# Patient Record
Sex: MALE | Race: WHITE | Employment: OTHER | URBAN - METROPOLITAN AREA
[De-identification: names, ages, dates, MRNs, and addresses within clinical notes are randomized per-mention and may not be internally consistent; named-entity substitution may affect disease eponyms.]

---

## 2021-01-27 ENCOUNTER — OFFICE VISIT (OUTPATIENT)
Dept: FAMILY MEDICINE CLINIC | Facility: CLINIC | Age: 66
End: 2021-01-27
Payer: MEDICARE

## 2021-01-27 ENCOUNTER — LAB ENCOUNTER (OUTPATIENT)
Dept: LAB | Age: 66
End: 2021-01-27
Attending: FAMILY MEDICINE
Payer: MEDICARE

## 2021-01-27 VITALS
HEART RATE: 71 BPM | BODY MASS INDEX: 29.83 KG/M2 | SYSTOLIC BLOOD PRESSURE: 128 MMHG | WEIGHT: 206 LBS | DIASTOLIC BLOOD PRESSURE: 82 MMHG | HEIGHT: 69.6 IN

## 2021-01-27 DIAGNOSIS — Z12.11 ENCOUNTER FOR SCREENING COLONOSCOPY: ICD-10-CM

## 2021-01-27 DIAGNOSIS — Z00.00 ANNUAL PHYSICAL EXAM: Primary | ICD-10-CM

## 2021-01-27 DIAGNOSIS — Z00.00 ANNUAL PHYSICAL EXAM: ICD-10-CM

## 2021-01-27 LAB
ALBUMIN SERPL-MCNC: 4 G/DL (ref 3.4–5)
ALBUMIN/GLOB SERPL: 1 {RATIO} (ref 1–2)
ALP LIVER SERPL-CCNC: 108 U/L
ALT SERPL-CCNC: 97 U/L
ANION GAP SERPL CALC-SCNC: 6 MMOL/L (ref 0–18)
AST SERPL-CCNC: 51 U/L (ref 15–37)
BASOPHILS # BLD AUTO: 0.04 X10(3) UL (ref 0–0.2)
BASOPHILS NFR BLD AUTO: 0.8 %
BILIRUB SERPL-MCNC: 0.5 MG/DL (ref 0.1–2)
BUN BLD-MCNC: 14 MG/DL (ref 7–18)
BUN/CREAT SERPL: 16.9 (ref 10–20)
CALCIUM BLD-MCNC: 9.4 MG/DL (ref 8.5–10.1)
CHLORIDE SERPL-SCNC: 107 MMOL/L (ref 98–112)
CHOLEST SMN-MCNC: 186 MG/DL (ref ?–200)
CO2 SERPL-SCNC: 25 MMOL/L (ref 21–32)
CREAT BLD-MCNC: 0.83 MG/DL
DEPRECATED RDW RBC AUTO: 40.5 FL (ref 35.1–46.3)
EOSINOPHIL # BLD AUTO: 0.07 X10(3) UL (ref 0–0.7)
EOSINOPHIL NFR BLD AUTO: 1.3 %
ERYTHROCYTE [DISTWIDTH] IN BLOOD BY AUTOMATED COUNT: 12.4 % (ref 11–15)
GLOBULIN PLAS-MCNC: 4.1 G/DL (ref 2.8–4.4)
GLUCOSE BLD-MCNC: 105 MG/DL (ref 70–99)
HCT VFR BLD AUTO: 45.4 %
HDLC SERPL-MCNC: 35 MG/DL (ref 40–59)
HGB BLD-MCNC: 15.4 G/DL
IMM GRANULOCYTES # BLD AUTO: 0.02 X10(3) UL (ref 0–1)
IMM GRANULOCYTES NFR BLD: 0.4 %
LDLC SERPL CALC-MCNC: 98 MG/DL (ref ?–100)
LYMPHOCYTES # BLD AUTO: 1.8 X10(3) UL (ref 1–4)
LYMPHOCYTES NFR BLD AUTO: 34.5 %
M PROTEIN MFR SERPL ELPH: 8.1 G/DL (ref 6.4–8.2)
MCH RBC QN AUTO: 30 PG (ref 26–34)
MCHC RBC AUTO-ENTMCNC: 33.9 G/DL (ref 31–37)
MCV RBC AUTO: 88.5 FL
MONOCYTES # BLD AUTO: 0.67 X10(3) UL (ref 0.1–1)
MONOCYTES NFR BLD AUTO: 12.8 %
NEUTROPHILS # BLD AUTO: 2.62 X10 (3) UL (ref 1.5–7.7)
NEUTROPHILS # BLD AUTO: 2.62 X10(3) UL (ref 1.5–7.7)
NEUTROPHILS NFR BLD AUTO: 50.2 %
NONHDLC SERPL-MCNC: 151 MG/DL (ref ?–130)
OSMOLALITY SERPL CALC.SUM OF ELEC: 287 MOSM/KG (ref 275–295)
PATIENT FASTING Y/N/NP: YES
PATIENT FASTING Y/N/NP: YES
PLATELET # BLD AUTO: 254 10(3)UL (ref 150–450)
POTASSIUM SERPL-SCNC: 4.3 MMOL/L (ref 3.5–5.1)
RBC # BLD AUTO: 5.13 X10(6)UL
SODIUM SERPL-SCNC: 138 MMOL/L (ref 136–145)
TRIGL SERPL-MCNC: 264 MG/DL (ref 30–149)
TSI SER-ACNC: 1.44 MIU/ML (ref 0.36–3.74)
VLDLC SERPL CALC-MCNC: 53 MG/DL (ref 0–30)
WBC # BLD AUTO: 5.2 X10(3) UL (ref 4–11)

## 2021-01-27 PROCEDURE — 84443 ASSAY THYROID STIM HORMONE: CPT

## 2021-01-27 PROCEDURE — 3079F DIAST BP 80-89 MM HG: CPT | Performed by: FAMILY MEDICINE

## 2021-01-27 PROCEDURE — 80061 LIPID PANEL: CPT

## 2021-01-27 PROCEDURE — 36415 COLL VENOUS BLD VENIPUNCTURE: CPT

## 2021-01-27 PROCEDURE — 85025 COMPLETE CBC W/AUTO DIFF WBC: CPT

## 2021-01-27 PROCEDURE — 80053 COMPREHEN METABOLIC PANEL: CPT

## 2021-01-27 PROCEDURE — 99213 OFFICE O/P EST LOW 20 MIN: CPT | Performed by: FAMILY MEDICINE

## 2021-01-27 PROCEDURE — 3074F SYST BP LT 130 MM HG: CPT | Performed by: FAMILY MEDICINE

## 2021-01-27 PROCEDURE — 3008F BODY MASS INDEX DOCD: CPT | Performed by: FAMILY MEDICINE

## 2021-01-27 RX ORDER — OMEPRAZOLE 20 MG/1
CAPSULE, DELAYED RELEASE ORAL
COMMUNITY
Start: 2021-01-04

## 2021-01-27 RX ORDER — LISINOPRIL 10 MG/1
TABLET ORAL
COMMUNITY
Start: 2021-01-04 | End: 2021-04-23

## 2021-01-27 NOTE — PROGRESS NOTES
HPI:    Patient ID: Rajani Guerrero is a 76year old male. Here for annual physical  Just retired. Patient for f/u hypertension   Denies any chest pain shortness of breath or headaches. Monitoring blood pressure at home and is below 135/85.  Needs refi Visit:  Requested Prescriptions      No prescriptions requested or ordered in this encounter       Imaging & Referrals:  OP REFERRAL TO [address]       OS#[id number]

## 2021-02-12 ENCOUNTER — OFFICE VISIT (OUTPATIENT)
Dept: FAMILY MEDICINE CLINIC | Facility: CLINIC | Age: 66
End: 2021-02-12
Payer: MEDICARE

## 2021-02-12 ENCOUNTER — LAB ENCOUNTER (OUTPATIENT)
Dept: LAB | Age: 66
End: 2021-02-12
Attending: FAMILY MEDICINE
Payer: MEDICARE

## 2021-02-12 VITALS
HEART RATE: 88 BPM | DIASTOLIC BLOOD PRESSURE: 82 MMHG | SYSTOLIC BLOOD PRESSURE: 140 MMHG | BODY MASS INDEX: 30.55 KG/M2 | WEIGHT: 211 LBS | HEIGHT: 69.6 IN

## 2021-02-12 DIAGNOSIS — R74.8 ABNORMAL LIVER ENZYMES: ICD-10-CM

## 2021-02-12 DIAGNOSIS — R74.8 ABNORMAL LIVER ENZYMES: Primary | ICD-10-CM

## 2021-02-12 LAB
CERULOPLASMIN SERPL-MCNC: 18.4 MG/DL (ref 20–60)
HAV AB SER QL IA: REACTIVE
HAV IGM SER QL: NONREACTIVE
HBV CORE AB SERPL QL IA: NONREACTIVE
HBV SURFACE AB SER QL: NONREACTIVE
HBV SURFACE AB SERPL IA-ACNC: <3.1 MIU/ML
HBV SURFACE AG SERPL QL IA: NONREACTIVE
HCV AB SERPL QL IA: NONREACTIVE
IRON SATURATION: 29 %
IRON SERPL-MCNC: 130 UG/DL
TOTAL IRON BINDING CAPACITY: 456 UG/DL (ref 240–450)
TRANSFERRIN SERPL-MCNC: 306 MG/DL (ref 200–360)

## 2021-02-12 PROCEDURE — 3077F SYST BP >= 140 MM HG: CPT | Performed by: FAMILY MEDICINE

## 2021-02-12 PROCEDURE — 82390 ASSAY OF CERULOPLASMIN: CPT

## 2021-02-12 PROCEDURE — 86803 HEPATITIS C AB TEST: CPT

## 2021-02-12 PROCEDURE — 86706 HEP B SURFACE ANTIBODY: CPT

## 2021-02-12 PROCEDURE — 84466 ASSAY OF TRANSFERRIN: CPT

## 2021-02-12 PROCEDURE — 86704 HEP B CORE ANTIBODY TOTAL: CPT

## 2021-02-12 PROCEDURE — 83540 ASSAY OF IRON: CPT

## 2021-02-12 PROCEDURE — 36415 COLL VENOUS BLD VENIPUNCTURE: CPT

## 2021-02-12 PROCEDURE — 80500 HEPATITIS A B + C PROFILE: CPT

## 2021-02-12 PROCEDURE — 99213 OFFICE O/P EST LOW 20 MIN: CPT | Performed by: FAMILY MEDICINE

## 2021-02-12 PROCEDURE — 3008F BODY MASS INDEX DOCD: CPT | Performed by: FAMILY MEDICINE

## 2021-02-12 PROCEDURE — 87340 HEPATITIS B SURFACE AG IA: CPT

## 2021-02-12 PROCEDURE — 86708 HEPATITIS A ANTIBODY: CPT

## 2021-02-12 PROCEDURE — 86709 HEPATITIS A IGM ANTIBODY: CPT

## 2021-02-12 PROCEDURE — 3079F DIAST BP 80-89 MM HG: CPT | Performed by: FAMILY MEDICINE

## 2021-02-12 NOTE — PROGRESS NOTES
HPI:    Patient ID: Garrison Hernandez is a 76year old male. Here for abnormal LFTs and trigs. Drinks more then recommended  Used to drink more now 3 beers few times a week.    No abdominal pain  No depression no anxiety       Review of Systems  Constituti

## 2021-02-18 NOTE — H&P
[address] [facility] [address] Gastroenterology                                                                                                  Clinic History and Physical     Pa Problems Son    • No Known Problems Maternal Grandmother    • No Known Problems Maternal Grandfather    • No Known Problems Paternal Grandmother    • No Known Problems Paternal Grandfather    • No Known Problems Sister    • No Known Problems Brother evident.    Neuro: Alert and oriented x4, and patient is having movements of all 4 extremities   Psych: Pt has a normal mood and affect, behavior is normal    Nursing note and vitals reviewed      Labs/Imaging:     Patient's labs and imaging were reviewed a diagnosis), benefits, and alternatives to colonoscopy with the patient [who demonstrated understanding], including but not limited to the risks of bleeding, infection, pain, as well as the risks of anesthesia and perforation all leading to prolonged hospit

## 2021-03-04 ENCOUNTER — TELEPHONE (OUTPATIENT)
Dept: GASTROENTEROLOGY | Facility: CLINIC | Age: 66
End: 2021-03-04

## 2021-03-04 ENCOUNTER — OFFICE VISIT (OUTPATIENT)
Dept: GASTROENTEROLOGY | Facility: CLINIC | Age: 66
End: 2021-03-04
Payer: MEDICARE

## 2021-03-04 VITALS
DIASTOLIC BLOOD PRESSURE: 87 MMHG | BODY MASS INDEX: 30.96 KG/M2 | SYSTOLIC BLOOD PRESSURE: 142 MMHG | HEART RATE: 83 BPM | WEIGHT: 209 LBS | HEIGHT: 69 IN

## 2021-03-04 DIAGNOSIS — Z12.11 SCREENING FOR COLON CANCER: Primary | ICD-10-CM

## 2021-03-04 DIAGNOSIS — Z79.899 MEDICATION MANAGEMENT: ICD-10-CM

## 2021-03-04 PROCEDURE — 3008F BODY MASS INDEX DOCD: CPT | Performed by: NURSE PRACTITIONER

## 2021-03-04 PROCEDURE — 99203 OFFICE O/P NEW LOW 30 MIN: CPT | Performed by: NURSE PRACTITIONER

## 2021-03-04 PROCEDURE — 3077F SYST BP >= 140 MM HG: CPT | Performed by: NURSE PRACTITIONER

## 2021-03-04 PROCEDURE — 3079F DIAST BP 80-89 MM HG: CPT | Performed by: NURSE PRACTITIONER

## 2021-03-04 RX ORDER — CHLORHEXIDINE GLUCONATE 0.12 MG/ML
RINSE ORAL
COMMUNITY
Start: 2021-03-01

## 2021-03-04 NOTE — TELEPHONE ENCOUNTER
Scheduled for:  Colonoscopy 85045  Provider Name:  Dr Ismael Mcgraw  Date: 6/15/2021    Location:  [facility]  Sedation:  MAC  Time:  9:45AM (pt is aware to arrive at 845)  Prep:  Suprep  Meds/Allergies Reconciled?:  Martha/APN reviewed.   Diagnosis with codes:  Co

## 2021-03-04 NOTE — PATIENT INSTRUCTIONS
-Schedule colonoscopy w/Dr. Eli Laboy w/ MAC or Dr. Terrell Leo w/ IV Twilight or MAC  Dx: screening   -Eligible for NE: Yes  -Prep: Split dose MiraLAX/Gadorade or equivalent  -Anti-platelets and anti-coagulants: None  -Diabetes meds: None    ** If MAC @ [facility]/

## 2021-03-05 DIAGNOSIS — Z23 NEED FOR VACCINATION: ICD-10-CM

## 2021-03-17 ENCOUNTER — HOSPITAL ENCOUNTER (OUTPATIENT)
Dept: ULTRASOUND IMAGING | Facility: HOSPITAL | Age: 66
Discharge: HOME | End: 2021-03-17
Attending: FAMILY MEDICINE
Payer: MEDICARE

## 2021-03-17 DIAGNOSIS — R74.8 ABNORMAL LIVER ENZYMES: ICD-10-CM

## 2021-03-17 PROCEDURE — 76705 ECHO EXAM OF ABDOMEN: CPT | Performed by: FAMILY MEDICINE

## 2021-03-19 ENCOUNTER — TELEPHONE (OUTPATIENT)
Dept: FAMILY MEDICINE CLINIC | Facility: CLINIC | Age: 66
End: 2021-03-19

## 2021-03-19 NOTE — TELEPHONE ENCOUNTER
Called pt name and date of birth verified informed pt of dr Jeannine Espitia message regarding a followup on test results per pt verbalized understanding, made pt appointment for march 23,2021 at 12:15pm

## 2021-03-23 ENCOUNTER — OFFICE VISIT (OUTPATIENT)
Dept: FAMILY MEDICINE CLINIC | Facility: CLINIC | Age: 66
End: 2021-03-23
Payer: MEDICARE

## 2021-03-23 ENCOUNTER — LAB ENCOUNTER (OUTPATIENT)
Dept: LAB | Age: 66
End: 2021-03-23
Attending: FAMILY MEDICINE
Payer: MEDICARE

## 2021-03-23 VITALS
BODY MASS INDEX: 30.96 KG/M2 | HEIGHT: 69 IN | HEART RATE: 85 BPM | SYSTOLIC BLOOD PRESSURE: 143 MMHG | DIASTOLIC BLOOD PRESSURE: 85 MMHG | WEIGHT: 209 LBS

## 2021-03-23 DIAGNOSIS — R79.89 ABNORMAL LFTS: Primary | ICD-10-CM

## 2021-03-23 DIAGNOSIS — R79.89 ABNORMAL LFTS: ICD-10-CM

## 2021-03-23 LAB
ALBUMIN SERPL-MCNC: 3.9 G/DL (ref 3.4–5)
ALP LIVER SERPL-CCNC: 94 U/L
ALT SERPL-CCNC: 75 U/L
AST SERPL-CCNC: 42 U/L (ref 15–37)
BILIRUB DIRECT SERPL-MCNC: 0.1 MG/DL (ref 0–0.2)
BILIRUB SERPL-MCNC: 0.4 MG/DL (ref 0.1–2)
M PROTEIN MFR SERPL ELPH: 7.7 G/DL (ref 6.4–8.2)

## 2021-03-23 PROCEDURE — 3077F SYST BP >= 140 MM HG: CPT | Performed by: FAMILY MEDICINE

## 2021-03-23 PROCEDURE — 3079F DIAST BP 80-89 MM HG: CPT | Performed by: FAMILY MEDICINE

## 2021-03-23 PROCEDURE — 36415 COLL VENOUS BLD VENIPUNCTURE: CPT

## 2021-03-23 PROCEDURE — 80076 HEPATIC FUNCTION PANEL: CPT

## 2021-03-23 PROCEDURE — 3008F BODY MASS INDEX DOCD: CPT | Performed by: FAMILY MEDICINE

## 2021-03-23 PROCEDURE — 99213 OFFICE O/P EST LOW 20 MIN: CPT | Performed by: FAMILY MEDICINE

## 2021-03-23 RX ORDER — IBUPROFEN 600 MG/1
600 TABLET ORAL 3 TIMES DAILY PRN
COMMUNITY
Start: 2021-03-16 | End: 2021-04-23

## 2021-03-23 RX ORDER — AMOXICILLIN 500 MG/1
CAPSULE ORAL
COMMUNITY
Start: 2021-03-16 | End: 2021-04-23

## 2021-03-23 RX ORDER — LISINOPRIL 20 MG/1
20 TABLET ORAL DAILY
Qty: 90 TABLET | Refills: 1 | Status: SHIPPED | OUTPATIENT
Start: 2021-03-23 | End: 2022-01-03

## 2021-03-23 RX ORDER — LISINOPRIL 5 MG/1
TABLET ORAL
COMMUNITY
Start: 2021-03-07 | End: 2021-03-23

## 2021-03-23 NOTE — PROGRESS NOTES
HPI/Subjective:   Patient ID: Garrison Hernandez is a 76year old male. Patient here for f/u test results. And hypertension   Patient for f/u hypertension   Denies any chest pain shortness of breath or headaches.    Monitoring blood pressure at home and is Function Panel (7) [E]      Comp Metabolic Panel (14) [E]      Meds This Visit:  Requested Prescriptions     Signed Prescriptions Disp Refills   • lisinopril 20 MG Oral Tab 90 tablet 1     Sig: Take 1 tablet (20 mg total) by mouth daily.        Imaging & Re

## 2021-04-23 ENCOUNTER — OFFICE VISIT (OUTPATIENT)
Dept: FAMILY MEDICINE CLINIC | Facility: CLINIC | Age: 66
End: 2021-04-23
Payer: MEDICARE

## 2021-04-23 ENCOUNTER — TELEPHONE (OUTPATIENT)
Dept: FAMILY MEDICINE CLINIC | Facility: CLINIC | Age: 66
End: 2021-04-23

## 2021-04-23 VITALS
HEIGHT: 69 IN | HEART RATE: 114 BPM | BODY MASS INDEX: 28.88 KG/M2 | DIASTOLIC BLOOD PRESSURE: 77 MMHG | SYSTOLIC BLOOD PRESSURE: 112 MMHG | WEIGHT: 195 LBS

## 2021-04-23 DIAGNOSIS — M54.89 OTHER BACK PAIN, UNSPECIFIED CHRONICITY: Primary | ICD-10-CM

## 2021-04-23 PROCEDURE — 99213 OFFICE O/P EST LOW 20 MIN: CPT | Performed by: FAMILY MEDICINE

## 2021-04-23 PROCEDURE — 3074F SYST BP LT 130 MM HG: CPT | Performed by: FAMILY MEDICINE

## 2021-04-23 PROCEDURE — 3008F BODY MASS INDEX DOCD: CPT | Performed by: FAMILY MEDICINE

## 2021-04-23 PROCEDURE — 3078F DIAST BP <80 MM HG: CPT | Performed by: FAMILY MEDICINE

## 2021-04-23 RX ORDER — GABAPENTIN 300 MG/1
300 CAPSULE ORAL 3 TIMES DAILY
COMMUNITY

## 2021-04-23 RX ORDER — CYCLOBENZAPRINE HCL 10 MG
10 TABLET ORAL 3 TIMES DAILY
Qty: 30 TABLET | Refills: 1 | Status: SHIPPED | OUTPATIENT
Start: 2021-04-23 | End: 2021-05-13

## 2021-04-23 NOTE — TELEPHONE ENCOUNTER
Prior authorization request from [facility] for:    •  cyclobenzaprine 10 MG Oral Tab, Take 1 tablet (10 mg total) by mouth 3 (three) times daily for 20 days. , Disp: 30 tablet, Rfl: 1    [employer]/login    Ke: [id number]

## 2021-04-23 NOTE — TELEPHONE ENCOUNTER
Prior authorization for Cyclobenzaprine completed w/ Candice on cover my meds Key: [id number], turn around time 1-5 days.

## 2021-04-26 NOTE — PROGRESS NOTES
HPI/Subjective:   Patient ID: Dominique Hernández is a 76year old male. Patient is here for f/u. Was in [place] and fell ie triped. Had x ray negative for fracture. Was given nsaids, pain meds, muscle relaxants .  Here for f/u      History/Other:   Review of abnormal muscle tone. Coordination: Coordination normal.      Deep Tendon Reflexes: Reflexes are normal and symmetric.          Assessment & Plan:   Other back pain, unspecified chronicity  (primary encounter diagnosis)  Much better cpm   Will benefit

## 2021-04-29 ENCOUNTER — TELEPHONE (OUTPATIENT)
Dept: PHYSICAL THERAPY | Facility: HOSPITAL | Age: 66
End: 2021-04-29

## 2021-05-04 ENCOUNTER — MED REC SCAN ONLY (OUTPATIENT)
Dept: FAMILY MEDICINE CLINIC | Facility: CLINIC | Age: 66
End: 2021-05-04

## 2021-05-25 ENCOUNTER — OFFICE VISIT (OUTPATIENT)
Dept: PHYSICAL THERAPY | Facility: HOSPITAL | Age: 66
End: 2021-05-25
Attending: FAMILY MEDICINE
Payer: MEDICARE

## 2021-05-25 DIAGNOSIS — M54.89 OTHER BACK PAIN, UNSPECIFIED CHRONICITY: ICD-10-CM

## 2021-05-25 PROCEDURE — 97161 PT EVAL LOW COMPLEX 20 MIN: CPT

## 2021-05-25 NOTE — PROGRESS NOTES
SPINE EVALUATION:   Referring Physician: Dr. Paramjit Rangel  Diagnosis: Other back pain, unspecified chronicity (M54.89)     Date of Service: 5/25/2021     PATIENT SUMMARY   Shruti Cortés is a 76year old male who presents to therapy today with complaints of low dizziness, drop attacks, bowel/bladder changes, saddle anesthesia, and EPHRAIM LE N/T.    ASSESSMENT  Alfredo presents to physical therapy evaluation with primary c/o low back pain, L>R.  The results of the objective tests and measures show poor posture, muscle mod restriction     Special tests:   SLR negative     Gait: pt ambulates on level ground with decreased hip/knee flex or ext  , stooped posture/forward lean and trendelenburg/waddle.       Today’s Treatment and Response:   Pt education was provided on exam participate in planning and for this course of care. Thank you for your referral. Please co-sign or sign and return this letter via fax as soon as possible to [phone].  If you have any questions, please contact me at Dept: [phone]    Sincerely,

## 2021-05-27 ENCOUNTER — OFFICE VISIT (OUTPATIENT)
Dept: PHYSICAL THERAPY | Facility: HOSPITAL | Age: 66
End: 2021-05-27
Attending: FAMILY MEDICINE
Payer: MEDICARE

## 2021-05-27 PROCEDURE — 97110 THERAPEUTIC EXERCISES: CPT

## 2021-05-27 PROCEDURE — 97140 MANUAL THERAPY 1/> REGIONS: CPT

## 2021-05-27 NOTE — PROGRESS NOTES
Dx: Other back pain, unspecified chronicity (M54.89)         Insurance (Authorized # of Visits):  [facility] 2/10           Authorizing Physician: Dr. Danyell Squires  Next MD visit: none scheduled  Fall Risk: standard         Precautions: n/a             Subjective Therapeutic Activities, Therapeutic Exercise and Home Exercise Program instruction  Date: 5/27/2021  TX#: 2/10 Date:                 TX#: 3/ Date:                 TX#: 4/ Date:                 TX#: 5/ Date:    Tx#: 6/   MT:   PPIVM flexion, gapping lumbar s

## 2021-06-01 ENCOUNTER — APPOINTMENT (OUTPATIENT)
Dept: PHYSICAL THERAPY | Facility: HOSPITAL | Age: 66
End: 2021-06-01
Attending: FAMILY MEDICINE
Payer: MEDICARE

## 2021-06-03 ENCOUNTER — OFFICE VISIT (OUTPATIENT)
Dept: PHYSICAL THERAPY | Facility: HOSPITAL | Age: 66
End: 2021-06-03
Attending: FAMILY MEDICINE
Payer: MEDICARE

## 2021-06-03 PROCEDURE — 97110 THERAPEUTIC EXERCISES: CPT

## 2021-06-04 NOTE — PROGRESS NOTES
Dx: Other back pain, unspecified chronicity (M54.89)         Insurance (Authorized # of Visits):  Linwood Welsh 3/10           Authorizing Physician: Dr. Meg Flynn  Next MD visit: none scheduled  Fall Risk: standard         Precautions: n/a             Subjective period.  Treatment will include: Gait training, Manual Therapy, Neuromuscular Re-education, Therapeutic Activities, Therapeutic Exercise and Home Exercise Program instruction  Date: 5/27/2021  TX#: 2/10 Date:  6/3/2021                TX#: 3/10 Date:

## 2021-06-08 ENCOUNTER — OFFICE VISIT (OUTPATIENT)
Dept: PHYSICAL THERAPY | Facility: HOSPITAL | Age: 66
End: 2021-06-08
Attending: FAMILY MEDICINE
Payer: MEDICARE

## 2021-06-08 PROCEDURE — 97110 THERAPEUTIC EXERCISES: CPT

## 2021-06-08 PROCEDURE — 97140 MANUAL THERAPY 1/> REGIONS: CPT

## 2021-06-08 NOTE — PROGRESS NOTES
Dx: Other back pain, unspecified chronicity (M54.89)         Insurance (Authorized # of Visits):  Cheryl Flank 4/10           Authorizing Physician: Dr. Lu Juarez  Next MD visit: none scheduled  Fall Risk: standard         Precautions: n/a             Subjective or a total of 10 visits over a 90 day period.  Treatment will include: Gait training, Manual Therapy, Neuromuscular Re-education, Therapeutic Activities, Therapeutic Exercise and Home Exercise Program instruction  Date: 5/27/2021  TX#: 2/10 Date:  6/3/2021

## 2021-06-10 ENCOUNTER — OFFICE VISIT (OUTPATIENT)
Dept: PHYSICAL THERAPY | Facility: HOSPITAL | Age: 66
End: 2021-06-10
Attending: FAMILY MEDICINE
Payer: MEDICARE

## 2021-06-10 PROCEDURE — 97110 THERAPEUTIC EXERCISES: CPT

## 2021-06-10 NOTE — PROGRESS NOTES
Dx: Other back pain, unspecified chronicity (M54.89)         Insurance (Authorized # of Visits):  Araseli Arnett 5/10           Authorizing Physician: Dr. Kelsy Carl  Next MD visit: none scheduled  Fall Risk: standard         Precautions: n/a             Subjective for work and home activities   · Pt will demonstrate improved core strength to be able to perform lifting with <2/10 pain   · Patient will be able to walk >45min with minimal back pain.    · Pt will be independent and compliant with comprehensive HEP to [facility]

## 2021-06-11 RX ORDER — SODIUM CHLORIDE, SODIUM LACTATE, POTASSIUM CHLORIDE, CALCIUM CHLORIDE 600; 310; 30; 20 MG/100ML; MG/100ML; MG/100ML; MG/100ML
INJECTION, SOLUTION INTRAVENOUS CONTINUOUS
Status: CANCELLED | OUTPATIENT
Start: 2021-06-11

## 2021-06-11 NOTE — PAT NURSING NOTE
Pt stated when he was in [facility] in March-April, he injured his back and was started on a medication called 'ardosons.' This RN tried to add the medication to his list, but was unable.  Per online review, ardosons is a combination medication including indome

## 2021-06-12 ENCOUNTER — LAB ENCOUNTER (OUTPATIENT)
Dept: LAB | Facility: HOSPITAL | Age: 66
End: 2021-06-12
Attending: INTERNAL MEDICINE
Payer: MEDICARE

## 2021-06-12 DIAGNOSIS — Z01.818 PRE-OP TESTING: ICD-10-CM

## 2021-06-15 ENCOUNTER — HOSPITAL ENCOUNTER (OUTPATIENT)
Facility: HOSPITAL | Age: 66
Setting detail: HOSPITAL OUTPATIENT SURGERY
Discharge: HOME | End: 2021-06-15
Attending: INTERNAL MEDICINE | Admitting: INTERNAL MEDICINE
Payer: MEDICARE

## 2021-06-15 ENCOUNTER — TELEPHONE (OUTPATIENT)
Dept: GASTROENTEROLOGY | Facility: CLINIC | Age: 66
End: 2021-06-15

## 2021-06-15 DIAGNOSIS — Z79.899 MEDICATION MANAGEMENT: ICD-10-CM

## 2021-06-15 DIAGNOSIS — Z01.818 PRE-OP TESTING: Primary | ICD-10-CM

## 2021-06-15 DIAGNOSIS — Z12.11 SCREENING FOR COLON CANCER: ICD-10-CM

## 2021-06-15 DIAGNOSIS — Z12.11 SCREENING FOR COLON CANCER: Primary | ICD-10-CM

## 2021-06-15 NOTE — TELEPHONE ENCOUNTER
Noted.    Schedulers-    Please assist in rescheduling the patient's colonoscopy per Dr. Veronique De La Fuente' message as seen below.     \"  Please contact the patient and reschedule the colonoscopy with a MiraLAX/Gatorade preparation and either MAC or IV sedation

## 2021-06-15 NOTE — TREATMENT PLAN
Patient did not take prep for colonoscopy procedure CX, Dr Lacy Núñez clinic to call patient and reschedule procedure.

## 2021-06-15 NOTE — TELEPHONE ENCOUNTER
GI RNs: The patient was scheduled for a screening colonoscopy today but took absolutely no bowel preparation. He stated that he was not given instructions. His procedure was canceled.   Please contact the patient and reschedule the colonoscopy with jd Wayne

## 2021-06-17 ENCOUNTER — OFFICE VISIT (OUTPATIENT)
Dept: PHYSICAL THERAPY | Facility: HOSPITAL | Age: 66
End: 2021-06-17
Attending: FAMILY MEDICINE
Payer: MEDICARE

## 2021-06-17 PROCEDURE — 97110 THERAPEUTIC EXERCISES: CPT

## 2021-06-17 NOTE — PROGRESS NOTES
Lou  Pt has attended 6 visits in Physical Therapy.      Dx: Other back pain, unspecified chronicity (M54.89)         Insurance (Authorized # of Visits):  Alphonse Gregorio 6/10           Authorizing Physician: Dr. Aníbal Carbajal MD visit: none schedule Hip Flexor: R mod-severe restriction, L mod-severe restriction  Hamstrings: R severe restriction; L severe restriction             Assessment:  At this time, patient notes subjective decrease in pain, improved tolerance to activity, and improving return t Cable walkout 17.6#, 22# 2x10 each      TE:   Hamstring stretch 2x30s bilateral  Attempt bridge 2x5   Standing hip ext GTB 2x10  Standing hip abd GTB 2x10   STS with 17# weight 3x10   SLS 3x15s  Tandem walk 4x8'  TE:   LTR 2x5 bilateral   Step ups 8\" 2x

## 2021-06-24 NOTE — TELEPHONE ENCOUNTER
Scheduled for:  Colonoscopy 71266  Provider Name:  Dr Gloria Kilgore  Date:  07/28/2021  Location:  [facility]  Sedation:  IV  Time:  2:45pm (pt is aware to arrive at 1:45pm)  Prep:  Miralax  Meds/Allergies Reconciled?:  Physician reviewed  Diagnosis with codes:  Colon ca

## 2021-06-24 NOTE — TELEPHONE ENCOUNTER
Dr Bruce Graves- I spoke to patient and scheduled him with Dr Severa Abed for 7/28/21 with IV sedation. I went over prep instructions with patient and also mailed out a copy. I advised him to please contact office with any prep questions, he verbally understood.

## 2021-07-24 RX ORDER — SODIUM CHLORIDE, SODIUM LACTATE, POTASSIUM CHLORIDE, CALCIUM CHLORIDE 600; 310; 30; 20 MG/100ML; MG/100ML; MG/100ML; MG/100ML
INJECTION, SOLUTION INTRAVENOUS CONTINUOUS
Status: CANCELLED | OUTPATIENT
Start: 2021-07-24

## 2021-07-24 RX ORDER — MIDAZOLAM HYDROCHLORIDE 1 MG/ML
1 INJECTION INTRAMUSCULAR; INTRAVENOUS EVERY 5 MIN PRN
Status: CANCELLED | OUTPATIENT
Start: 2021-07-24

## 2021-07-24 RX ORDER — SODIUM CHLORIDE 0.9 % (FLUSH) 0.9 %
10 SYRINGE (ML) INJECTION AS NEEDED
Status: CANCELLED | OUTPATIENT
Start: 2021-07-24

## 2021-07-28 ENCOUNTER — HOSPITAL ENCOUNTER (OUTPATIENT)
Facility: HOSPITAL | Age: 66
Setting detail: HOSPITAL OUTPATIENT SURGERY
Discharge: HOME | End: 2021-07-28
Attending: INTERNAL MEDICINE | Admitting: INTERNAL MEDICINE
Payer: MEDICARE

## 2021-07-28 VITALS
OXYGEN SATURATION: 100 % | SYSTOLIC BLOOD PRESSURE: 125 MMHG | BODY MASS INDEX: 29.35 KG/M2 | HEIGHT: 70 IN | RESPIRATION RATE: 18 BRPM | WEIGHT: 205 LBS | TEMPERATURE: 98 F | HEART RATE: 64 BPM | DIASTOLIC BLOOD PRESSURE: 95 MMHG

## 2021-07-28 DIAGNOSIS — Z12.11 SCREENING FOR COLON CANCER: ICD-10-CM

## 2021-07-28 PROCEDURE — 45380 COLONOSCOPY AND BIOPSY: CPT | Performed by: INTERNAL MEDICINE

## 2021-07-28 PROCEDURE — G0500 MOD SEDAT ENDO SERVICE >5YRS: HCPCS | Performed by: INTERNAL MEDICINE

## 2021-07-28 PROCEDURE — 45385 COLONOSCOPY W/LESION REMOVAL: CPT | Performed by: INTERNAL MEDICINE

## 2021-07-28 PROCEDURE — 0DBL8ZX EXCISION OF TRANSVERSE COLON, VIA NATURAL OR ARTIFICIAL OPENING ENDOSCOPIC, DIAGNOSTIC: ICD-10-PCS | Performed by: INTERNAL MEDICINE

## 2021-07-28 PROCEDURE — 0DBM8ZX EXCISION OF DESCENDING COLON, VIA NATURAL OR ARTIFICIAL OPENING ENDOSCOPIC, DIAGNOSTIC: ICD-10-PCS | Performed by: INTERNAL MEDICINE

## 2021-07-28 PROCEDURE — 0DBK8ZX EXCISION OF ASCENDING COLON, VIA NATURAL OR ARTIFICIAL OPENING ENDOSCOPIC, DIAGNOSTIC: ICD-10-PCS | Performed by: INTERNAL MEDICINE

## 2021-07-28 RX ORDER — SODIUM CHLORIDE, SODIUM LACTATE, POTASSIUM CHLORIDE, CALCIUM CHLORIDE 600; 310; 30; 20 MG/100ML; MG/100ML; MG/100ML; MG/100ML
INJECTION, SOLUTION INTRAVENOUS CONTINUOUS
Status: DISCONTINUED | OUTPATIENT
Start: 2021-07-28 | End: 2021-07-28

## 2021-07-28 RX ORDER — MIDAZOLAM HYDROCHLORIDE 1 MG/ML
INJECTION INTRAMUSCULAR; INTRAVENOUS
Status: DISCONTINUED | OUTPATIENT
Start: 2021-07-28 | End: 2021-07-28

## 2021-07-28 NOTE — H&P
History & Physical Examination    Patient Name: Hien Ma  MRN: [id number]  CSN: [id number]  YOB: 1955    Diagnosis: screening for colon cancer    gabapentin 300 MG Oral Cap, Take 300 mg by mouth 3 (three) times daily. , Disp: , Rfl: , 7/2 History and Physical done within the last 30 days. Any changes noted above.     Chris Marques, [address] [facility] - Gastroenterology  7/28/2021  2:55 PM

## 2021-07-28 NOTE — OPERATIVE REPORT
COLONOSCOPY REPORT    Renard Mckeon     1955 Age 76year old   PCP No primary care provider on file.  Endoscopist Kai Welch MD     Date of procedure: 21    Procedure: Colonoscopy w/cold snare polypectomy and cold biopsy    Pre-oper patient who tolerated the procedure well. Complications: none. Findings:   1. 10 polyp(s) noted as follows:      A. Ascending colon: three polyps, all sessile, ranging in size from 5-8mm, s/p cold snare polypectomy and retrieved.       B. Transverse

## 2021-08-03 ENCOUNTER — TELEPHONE (OUTPATIENT)
Dept: GASTROENTEROLOGY | Facility: CLINIC | Age: 66
End: 2021-08-03

## 2021-08-03 NOTE — TELEPHONE ENCOUNTER
Kathi Paredes MD  [facility] Clinical [facility]  GI staff: please place recall for colonoscopy in 1 year       Results letter sent to patient via [address] and also printed and mailed out to patient. Patient outreach entered for 1 year colonoscopy recall.  Stuart Diego

## 2021-08-06 ENCOUNTER — OFFICE VISIT (OUTPATIENT)
Dept: FAMILY MEDICINE CLINIC | Facility: CLINIC | Age: 66
End: 2021-08-06
Payer: MEDICARE

## 2021-08-06 VITALS
HEIGHT: 70 IN | BODY MASS INDEX: 29.06 KG/M2 | DIASTOLIC BLOOD PRESSURE: 73 MMHG | TEMPERATURE: 97 F | RESPIRATION RATE: 16 BRPM | HEART RATE: 72 BPM | WEIGHT: 203 LBS | SYSTOLIC BLOOD PRESSURE: 110 MMHG

## 2021-08-06 DIAGNOSIS — I10 ESSENTIAL HYPERTENSION: ICD-10-CM

## 2021-08-06 DIAGNOSIS — Z71.2 ENCOUNTER TO DISCUSS COLONOSCOPY RESULTS: Primary | ICD-10-CM

## 2021-08-06 DIAGNOSIS — M54.89 OTHER BACK PAIN, UNSPECIFIED CHRONICITY: ICD-10-CM

## 2021-08-06 PROCEDURE — 3078F DIAST BP <80 MM HG: CPT | Performed by: FAMILY MEDICINE

## 2021-08-06 PROCEDURE — 3074F SYST BP LT 130 MM HG: CPT | Performed by: FAMILY MEDICINE

## 2021-08-06 PROCEDURE — 99213 OFFICE O/P EST LOW 20 MIN: CPT | Performed by: FAMILY MEDICINE

## 2021-08-06 PROCEDURE — 3008F BODY MASS INDEX DOCD: CPT | Performed by: FAMILY MEDICINE

## 2021-08-06 NOTE — PROGRESS NOTES
HPI/Subjective:   Patient ID: Carlos Sapp is a 76year old male. Patient here for f/u colonoscopy results- 10 tubular adenomas-needs f/u in 1 year  Patient for f/u hypertension   Denies any chest pain shortness of breath or headaches.    Monitoring blo Deep Tendon Reflexes: Reflexes are normal and symmetric.          Assessment & Plan:   Encounter to discuss colonoscopy results  (primary encounter diagnosis)  Other back pain, unspecified chronicity  Essential hypertension  cpm   Back pain appears muscular

## 2022-01-03 RX ORDER — LISINOPRIL 20 MG/1
TABLET ORAL
Qty: 90 TABLET | Refills: 1 | Status: SHIPPED | OUTPATIENT
Start: 2022-01-03

## 2022-05-17 ENCOUNTER — TELEPHONE (OUTPATIENT)
Dept: GASTROENTEROLOGY | Facility: CLINIC | Age: 67
End: 2022-05-17

## 2023-08-30 ENCOUNTER — LAB ENCOUNTER (OUTPATIENT)
Dept: LAB | Age: 68
End: 2023-08-30
Attending: FAMILY MEDICINE
Payer: MEDICARE

## 2023-08-30 ENCOUNTER — OFFICE VISIT (OUTPATIENT)
Dept: FAMILY MEDICINE CLINIC | Facility: CLINIC | Age: 68
End: 2023-08-30

## 2023-08-30 VITALS
DIASTOLIC BLOOD PRESSURE: 82 MMHG | SYSTOLIC BLOOD PRESSURE: 119 MMHG | HEART RATE: 72 BPM | OXYGEN SATURATION: 97 % | RESPIRATION RATE: 16 BRPM | BODY MASS INDEX: 28.2 KG/M2 | WEIGHT: 197 LBS | HEIGHT: 70 IN

## 2023-08-30 DIAGNOSIS — I10 ESSENTIAL HYPERTENSION: Primary | ICD-10-CM

## 2023-08-30 DIAGNOSIS — I10 ESSENTIAL HYPERTENSION: ICD-10-CM

## 2023-08-30 DIAGNOSIS — Z00.00 ENCOUNTER FOR ANNUAL HEALTH EXAMINATION: ICD-10-CM

## 2023-08-30 DIAGNOSIS — R93.3 ABNORMAL COLONOSCOPY: ICD-10-CM

## 2023-08-30 LAB
ALBUMIN SERPL-MCNC: 3.9 G/DL (ref 3.4–5)
ALBUMIN/GLOB SERPL: 1.1 {RATIO} (ref 1–2)
ALP LIVER SERPL-CCNC: 97 U/L
ALT SERPL-CCNC: 36 U/L
ANION GAP SERPL CALC-SCNC: 7 MMOL/L (ref 0–18)
AST SERPL-CCNC: 29 U/L (ref 15–37)
BASOPHILS # BLD AUTO: 0.04 X10(3) UL (ref 0–0.2)
BASOPHILS NFR BLD AUTO: 0.9 %
BILIRUB SERPL-MCNC: 0.6 MG/DL (ref 0.1–2)
BUN BLD-MCNC: 11 MG/DL (ref 7–18)
BUN/CREAT SERPL: 12 (ref 10–20)
CALCIUM BLD-MCNC: 9.3 MG/DL (ref 8.5–10.1)
CHLORIDE SERPL-SCNC: 106 MMOL/L (ref 98–112)
CHOLEST SERPL-MCNC: 172 MG/DL (ref ?–200)
CO2 SERPL-SCNC: 24 MMOL/L (ref 21–32)
CREAT BLD-MCNC: 0.92 MG/DL
DEPRECATED RDW RBC AUTO: 38.6 FL (ref 35.1–46.3)
EGFRCR SERPLBLD CKD-EPI 2021: 90 ML/MIN/1.73M2 (ref 60–?)
EOSINOPHIL # BLD AUTO: 0.08 X10(3) UL (ref 0–0.7)
EOSINOPHIL NFR BLD AUTO: 1.8 %
ERYTHROCYTE [DISTWIDTH] IN BLOOD BY AUTOMATED COUNT: 12 % (ref 11–15)
FASTING PATIENT LIPID ANSWER: YES
FASTING STATUS PATIENT QL REPORTED: YES
GLOBULIN PLAS-MCNC: 3.5 G/DL (ref 2.8–4.4)
GLUCOSE BLD-MCNC: 94 MG/DL (ref 70–99)
HCT VFR BLD AUTO: 44 %
HDLC SERPL-MCNC: 34 MG/DL (ref 40–59)
HGB BLD-MCNC: 15.2 G/DL
IMM GRANULOCYTES # BLD AUTO: 0.03 X10(3) UL (ref 0–1)
IMM GRANULOCYTES NFR BLD: 0.7 %
LDLC SERPL CALC-MCNC: 93 MG/DL (ref ?–100)
LYMPHOCYTES # BLD AUTO: 1.35 X10(3) UL (ref 1–4)
LYMPHOCYTES NFR BLD AUTO: 30.2 %
MCH RBC QN AUTO: 30.6 PG (ref 26–34)
MCHC RBC AUTO-ENTMCNC: 34.5 G/DL (ref 31–37)
MCV RBC AUTO: 88.5 FL
MONOCYTES # BLD AUTO: 0.68 X10(3) UL (ref 0.1–1)
MONOCYTES NFR BLD AUTO: 15.2 %
NEUTROPHILS # BLD AUTO: 2.29 X10 (3) UL (ref 1.5–7.7)
NEUTROPHILS # BLD AUTO: 2.29 X10(3) UL (ref 1.5–7.7)
NEUTROPHILS NFR BLD AUTO: 51.2 %
NONHDLC SERPL-MCNC: 138 MG/DL (ref ?–130)
OSMOLALITY SERPL CALC.SUM OF ELEC: 283 MOSM/KG (ref 275–295)
PLATELET # BLD AUTO: 261 10(3)UL (ref 150–450)
POTASSIUM SERPL-SCNC: 4.3 MMOL/L (ref 3.5–5.1)
PROT SERPL-MCNC: 7.4 G/DL (ref 6.4–8.2)
RBC # BLD AUTO: 4.97 X10(6)UL
SODIUM SERPL-SCNC: 137 MMOL/L (ref 136–145)
TRIGL SERPL-MCNC: 266 MG/DL (ref 30–149)
VLDLC SERPL CALC-MCNC: 44 MG/DL (ref 0–30)
WBC # BLD AUTO: 4.5 X10(3) UL (ref 4–11)

## 2023-08-30 PROCEDURE — 90677 PCV20 VACCINE IM: CPT | Performed by: FAMILY MEDICINE

## 2023-08-30 PROCEDURE — 3008F BODY MASS INDEX DOCD: CPT | Performed by: FAMILY MEDICINE

## 2023-08-30 PROCEDURE — 36415 COLL VENOUS BLD VENIPUNCTURE: CPT

## 2023-08-30 PROCEDURE — 1170F FXNL STATUS ASSESSED: CPT | Performed by: FAMILY MEDICINE

## 2023-08-30 PROCEDURE — 3079F DIAST BP 80-89 MM HG: CPT | Performed by: FAMILY MEDICINE

## 2023-08-30 PROCEDURE — G0439 PPPS, SUBSEQ VISIT: HCPCS | Performed by: FAMILY MEDICINE

## 2023-08-30 PROCEDURE — G0009 ADMIN PNEUMOCOCCAL VACCINE: HCPCS | Performed by: FAMILY MEDICINE

## 2023-08-30 PROCEDURE — 80053 COMPREHEN METABOLIC PANEL: CPT

## 2023-08-30 PROCEDURE — 3074F SYST BP LT 130 MM HG: CPT | Performed by: FAMILY MEDICINE

## 2023-08-30 PROCEDURE — 1126F AMNT PAIN NOTED NONE PRSNT: CPT | Performed by: FAMILY MEDICINE

## 2023-08-30 PROCEDURE — 96160 PT-FOCUSED HLTH RISK ASSMT: CPT | Performed by: FAMILY MEDICINE

## 2023-08-30 PROCEDURE — 1159F MED LIST DOCD IN RCRD: CPT | Performed by: FAMILY MEDICINE

## 2023-08-30 PROCEDURE — 1160F RVW MEDS BY RX/DR IN RCRD: CPT | Performed by: FAMILY MEDICINE

## 2023-08-30 PROCEDURE — 99213 OFFICE O/P EST LOW 20 MIN: CPT | Performed by: FAMILY MEDICINE

## 2023-08-30 PROCEDURE — 80061 LIPID PANEL: CPT

## 2023-08-30 PROCEDURE — 85025 COMPLETE CBC W/AUTO DIFF WBC: CPT

## 2023-09-18 ENCOUNTER — TELEPHONE (OUTPATIENT)
Facility: CLINIC | Age: 68
End: 2023-09-18

## 2023-09-18 NOTE — TELEPHONE ENCOUNTER
Dr. Collin Porter    Patient called to schedule 1 year recall colonoscopy that is overdue. Please provide orders if ok to schedule directly. Thank you    Last Procedure, Date, MD:  Colonoscopy Dr. Collin Porter 7/28/2021  Last Diagnosis:    Recalled (mth/yrs): 1 year   Sedation Used Previously:  iv sedation  Last Prep Used (if known):  Miralax  Quality Of Prep (if known):  good  Anticoagulants: no  Diabetic Med's (PO/Injectables): no  Weight loss Med's: no  Iron/Herbal/Multivitamin Supplements (RX/OTC): Bioxtron natural supplement for arthritis. Marijuana/Vaping/CBD: no  Height & Weight: 5'10\" 197 lbs  BMI: 28.27  Hx of Cardiac/CVA Issues/(MI/Stroke): no  Devices Pacemaker/Defibrillator/Stents: no  Respiratory Issues/Oxygen Use/ANH/COPD:no  Issues w/ Anesthesia: no    Symptoms (Y/N): no  Symptoms Details: n/a    Special Comments/Notes:  Raquel Serrano ID # [id number] used for call    Please advise on orders and prep. Thank you!

## 2023-09-18 NOTE — TELEPHONE ENCOUNTER
Case Time: Procedures: Surgeons:   2021  3:05 PM COLONOSCOPY    Korin Cottrell MD               Signed         COLONOSCOPY REPORT           Tamra BENITEZ 1955 Age 76year old   PCP No primary care provider on file. Endoscopist Ursula Frankel MD      Date of procedure: 21     Procedure: Colonoscopy w/cold snare polypectomy and cold biopsy     Pre-operative diagnosis: Screening     Post-operative diagnosis: Colon polyps, internal hemorrhoids     Medications: Versed 7 mg IV push. Fentanyl 100 mcg IV push. [Per my order and under my supervision, the patient was sedated with intermittent intravenous doses of versed and fentanyl. The vital signs were monitored and recorded by an experienced RN. The procedure started after the patient was adequately sedated. Total time for moderate conscious sedation was 33 minutes]. Withdrawal time: 21 minutes     Procedure:  Informed consent was obtained from the patient after the risks of the procedure were discussed, including but not limited to bleeding, perforation, aspiration, infection, or possibility of a missed lesion. After discussions of the risks/benefits and alternatives to this procedure, as well as the planned sedation, the patient was placed in the left lateral decubitus position and begun on continuous blood pressure pulse oximetry and EKG monitoring and this was maintained throughout the procedure. Once an adequate level of sedation was obtained a digital rectal exam was completed. Then the lubricated tip of the [id number] diagnostic video colonoscope was inserted and advanced without difficulty to the cecum using the CO2 insufflation technique. The cecum was identified by localizing the trifold, the appendix and the ileocecal valve. Withdrawal was begun with thorough washing and careful examination of the colonic walls and folds. A routine second examination of the cecum/ascending colon was performed.  Photodocumentation was obtained. The bowel prep was good. Views of the colon were good with washing. I then carefully withdrew the instrument from the patient who tolerated the procedure well. Complications: none. Findings:   1. 10 polyp(s) noted as follows:      A. Ascending colon: three polyps, all sessile, ranging in size from 5-8mm, s/p cold snare polypectomy and retrieved. B. Transverse colon: three polyps (2 polyps, 5-8mm, sessile cold snare polypectomy and removed); 1 polyp was 3 mm in size and removed by cold biopsy. C. Descending colon: four polyps (3 polyps, 5-8mm, sessile cold snare polypectomy and removed); 1 polyp was 3 mm in size and removed by cold biopsy. 2. Diverticulosis: none. 3. Terminal ileum: the visualized mucosa appeared normal.     4. A retroflexed view of the rectum revealed small internal hemorrhoids. 5. The colonic mucosa throughout the colon showed normal vascular pattern, without evidence of angioectasias or inflammation. 6. ADRY: normal rectal tone, no masses palpated. Impression:   Ten small colon polyps removed. Small internal hemorrhoids. Recommend:  Await pathology. Repeat CLN in 1 year. If new signs or symptoms develop, colonoscopy may need to be repeated sooner. High fiber diet. Monitor for blood in the stool. If having more than just tinge of blood, call office or go to the ER.     >>>If tissue was obtained and you have not received your pathology results either by phone or letter within 2 weeks, please call our office at [phone].      Specimens: colon     Blood loss: <1 ml            Electronically signed by Chris Ramirez MD at 7/28/2021  3:40 PM  ollected 7/28/2021  3:09 PM       Status: Final result       Visible to patient: Yes (not seen)       Dx: Screening for colon cancer    0 Result Notes       1 Follow-up Encounter        Component  Ref Range & Units      Case Report     Surgical Pathology                                Case: [id number] Authorizing Provider:  Jacinta Lemus MD        Collected:           07/28/2021 03:09 PM             Ordering Location:     [facility]          Received:            07/29/2021 06:06 AM                                    Endoscopy Lab Suites                                                           Pathologist:           Brandi Hwang MD                                                             Specimens:   A) - Colon ascending, polyp x3                                                                        B) - Colon transverse, polyp x3                                                                        C) - Colon descending, polyp x4                                                                Final Diagnosis:        A. Ascending colon polyps (3); polypectomy:  Fragments of tubular adenomas (1.5 cm in maximum dimension in aggregate). No evidence of severe dysplasia/carcinoma in situ or infiltrating carcinoma identified. B.  Transverse colon polyps (3); polypectomy:  Fragments of tubular adenomas and fragments of unremarkable colonic mucosa (1.5 cm in maximum dimension in aggregate). No evidence of severe dysplasia/carcinoma in situ or infiltrating carcinoma identified. Fragments of foreign body food/fecal material are also present. C.  Descending colon polyps (4); polypectomy:  Fragments of tubular adenomas (1.4 cm in maximum dimension in aggregate). No evidence of severe dysplasia/carcinoma in situ or infiltrating carcinoma identified. Few fragments of foreign body food/fecal material are also present. Electronically signed by Brandi Hwang MD on 7/29/2021 at  1:44 PM        Clinical Information      Z12.11 Screening For Colon Cancer. Polyp, hemorrhoids. Gross Description      Specimen A is labeled \"Guerra, ascending colon polyps x3\" received in formalin.  The specimen consists of three fragments of tan soft tissue measuring in aggregate 1.5 x 0.8 x 0.3 cm. Submitted entirely in cassette A1. Specimen B is labeled \"Guerra, transverse colon polyps x3\" received in formalin. The specimen consists of multiple fragments of tan soft tissue admixed with debris measuring in aggregate 2.0 x 1.2 x 0.3 cm. The tissue fragments alone measure 1.5 cm in maximum dimension in aggregate. Submitted entirely in cassette B1. Specimen C is labeled \"Guerra, descending colon polyps x4\" received in formalin. The specimen consists of multiple fragments of tan soft tissue measuring in aggregate 1.4 x 1.0 x 0.2 cm. Few fragments of fecal debris are also present.  Submitted entirely in cassette C1. (jq)      Jonathon Baum M.D./rancho       Interpretation     Benign     Electronically signed by Madi Palacios MD on 7/29/2021 at  1:44 PM     [address] [facility])                     Susan Ghosh MD  [facility] Clinical [facility]  GI staff: please place recall for colonoscopy in 1 year

## 2023-09-27 RX ORDER — POLYETHYLENE GLYCOL 3350, SODIUM CHLORIDE, SODIUM BICARBONATE, POTASSIUM CHLORIDE 420; 11.2; 5.72; 1.48 G/4L; G/4L; G/4L; G/4L
POWDER, FOR SOLUTION ORAL
Qty: 4000 ML | Refills: 0 | Status: SHIPPED | OUTPATIENT
Start: 2023-09-27

## 2023-10-25 ENCOUNTER — OFFICE VISIT (OUTPATIENT)
Dept: FAMILY MEDICINE CLINIC | Facility: CLINIC | Age: 68
End: 2023-10-25

## 2023-10-25 VITALS
SYSTOLIC BLOOD PRESSURE: 122 MMHG | BODY MASS INDEX: 28.54 KG/M2 | RESPIRATION RATE: 18 BRPM | WEIGHT: 199.38 LBS | OXYGEN SATURATION: 97 % | HEIGHT: 70 IN | HEART RATE: 72 BPM | DIASTOLIC BLOOD PRESSURE: 73 MMHG

## 2023-10-25 DIAGNOSIS — I10 ESSENTIAL HYPERTENSION: ICD-10-CM

## 2023-10-25 DIAGNOSIS — D36.9 POLYPOID ADENOMA: Primary | ICD-10-CM

## 2023-10-25 PROCEDURE — 1160F RVW MEDS BY RX/DR IN RCRD: CPT | Performed by: FAMILY MEDICINE

## 2023-10-25 PROCEDURE — 3074F SYST BP LT 130 MM HG: CPT | Performed by: FAMILY MEDICINE

## 2023-10-25 PROCEDURE — G0008 ADMIN INFLUENZA VIRUS VAC: HCPCS | Performed by: FAMILY MEDICINE

## 2023-10-25 PROCEDURE — 99214 OFFICE O/P EST MOD 30 MIN: CPT | Performed by: FAMILY MEDICINE

## 2023-10-25 PROCEDURE — 1126F AMNT PAIN NOTED NONE PRSNT: CPT | Performed by: FAMILY MEDICINE

## 2023-10-25 PROCEDURE — 3008F BODY MASS INDEX DOCD: CPT | Performed by: FAMILY MEDICINE

## 2023-10-25 PROCEDURE — 90662 IIV NO PRSV INCREASED AG IM: CPT | Performed by: FAMILY MEDICINE

## 2023-10-25 PROCEDURE — 1159F MED LIST DOCD IN RCRD: CPT | Performed by: FAMILY MEDICINE

## 2023-10-25 PROCEDURE — 3078F DIAST BP <80 MM HG: CPT | Performed by: FAMILY MEDICINE

## 2023-10-25 NOTE — PROGRESS NOTES
Subjective:   Patient ID: Miguel Saldivar is a 70year old male. HPI  Patient for f/u hypertension   Denies any chest pain shortness of breath or headaches. Monitoring blood pressure at home and is below 135/85. Needs refill of medications. History/Other:   Review of Systems  Constitutional: Negative. Negative for activity change, appetite change, diaphoresis and fatigue. Respiratory: Negative. Negative for apnea, cough, chest tightness and shortness of breath. Cardiovascular: Negative. Negative for chest pain, palpitations and leg swelling. Gastrointestinal: Negative. Negative for abdominal pain. Current Outpatient Medications   Medication Sig Dispense Refill    PEG 3350-KCl-Na Bicarb-NaCl (TRILYTE) 420 g Oral Recon Soln Take prep as directed by gastro office. May substitute with Trilyte/generic equivalent if needed. 4000 mL 0    NON FORMULARY Take by mouth daily. Bioxtron supplement for arthritis pain. LISINOPRIL 20 MG Oral Tab TAKE 1 TABLET(20 MG) BY MOUTH DAILY 90 tablet 1     Allergies:No Known Allergies    Objective:   Physical Exam  Constitutional:       Appearance: He is well-developed. Cardiovascular:      Rate and Rhythm: Normal rate and regular rhythm. Heart sounds: Normal heart sounds. Pulmonary:      Effort: Pulmonary effort is normal.      Breath sounds: Normal breath sounds. Neurological:      Mental Status: He is alert. Deep Tendon Reflexes: Reflexes are normal and symmetric.          Assessment & Plan:   Polypoid adenoma  (primary encounter diagnosis)  NEEDS COLONOSCOPY   HYPERTENSION CPM     Orders Placed This Encounter      High Dose Fluzone 72 yr and older PFS [[id number]]      Meds This Visit:  Requested Prescriptions      No prescriptions requested or ordered in this encounter       Imaging & Referrals:  FLU VACC HIGH DOSE PRSV FREE  GASTRO - INTERNAL

## 2023-11-04 NOTE — TELEPHONE ENCOUNTER
This is the third attempt to reach this pt in regards to scheduling with NO success scheduling procedure using the language line. Letter sent via mail and [employer]. TE closed.

## 2023-12-28 ENCOUNTER — TELEPHONE (OUTPATIENT)
Facility: CLINIC | Age: 68
End: 2023-12-28

## 2023-12-28 DIAGNOSIS — Z86.010 HISTORY OF COLON POLYPS: Primary | ICD-10-CM

## 2024-01-03 NOTE — TELEPHONE ENCOUNTER
Called patient to schedule procedure. No answer. LMTCB.    Carolina Reynolds, APRN         9/27/23 12:06 PM  Note  Scheduling:  cln w/ IV w/ Dr. Hoover  Dx: colon polyps  trilyte split dose sent e-scribe

## 2024-01-19 ENCOUNTER — PATIENT MESSAGE (OUTPATIENT)
Facility: CLINIC | Age: 69
End: 2024-01-19

## 2024-01-19 NOTE — TELEPHONE ENCOUNTER
Scheduled for:  Colonoscopy 83828   Provider Name:  Dr Hoover    Date:  4/9/2024  Time:   2:45PM (Patient aware to arrive ONE hour early)  Location:  [facility]    Sedation:  IV (Conscious sedation )   Prep:  Split TriLyte    Meds/Allergies Reconciled?:   Provider Reviewed     Diagnosis with codes:    Hx of Colon Polyps Z86.010    Was patient informed to call insurance with codes (Y/N):  Yes     Referral sent?: Referral was sent at the time of electronic surgical scheduling.    EM or [facility] notified?: I sent an electronic request to ENDO Scheduling and received a confirmation today.     Medication Orders:  Patient is aware to NOT take iron pills, herbal meds and diet supplements for 7 days before exam. Also to NOT take any form of alcohol, recreational drugs and any forms of ED meds 24 hours before exam.       Misc Orders:  N/A     Further instructions given by staff:  I Provided prep instructions to patient and reviewed date, time and location. Patient verbalized that  He / His understood and is aware to call with any questions.    Patient was informed about the new cancellation policy for He / His procedure. Patient was also given copy of the cancellation policy at the time of the appointment and verbalized understanding.

## 2024-02-06 ENCOUNTER — TELEPHONE (OUTPATIENT)
Dept: FAMILY MEDICINE CLINIC | Facility: CLINIC | Age: 69
End: 2024-02-06

## 2024-02-06 ENCOUNTER — OFFICE VISIT (OUTPATIENT)
Dept: FAMILY MEDICINE CLINIC | Facility: CLINIC | Age: 69
End: 2024-02-06

## 2024-02-06 VITALS
HEART RATE: 110 BPM | HEIGHT: 70 IN | DIASTOLIC BLOOD PRESSURE: 79 MMHG | RESPIRATION RATE: 20 BRPM | SYSTOLIC BLOOD PRESSURE: 129 MMHG | WEIGHT: 202.19 LBS | BODY MASS INDEX: 28.95 KG/M2 | OXYGEN SATURATION: 96 %

## 2024-02-06 DIAGNOSIS — R93.3 ABNORMAL COLONOSCOPY: ICD-10-CM

## 2024-02-06 DIAGNOSIS — Z00.00 ENCOUNTER FOR ANNUAL HEALTH EXAMINATION: ICD-10-CM

## 2024-02-06 DIAGNOSIS — I10 ESSENTIAL HYPERTENSION: Primary | ICD-10-CM

## 2024-02-06 PROCEDURE — 3074F SYST BP LT 130 MM HG: CPT | Performed by: FAMILY MEDICINE

## 2024-02-06 PROCEDURE — 1160F RVW MEDS BY RX/DR IN RCRD: CPT | Performed by: FAMILY MEDICINE

## 2024-02-06 PROCEDURE — G0439 PPPS, SUBSEQ VISIT: HCPCS | Performed by: FAMILY MEDICINE

## 2024-02-06 PROCEDURE — 3008F BODY MASS INDEX DOCD: CPT | Performed by: FAMILY MEDICINE

## 2024-02-06 PROCEDURE — 1125F AMNT PAIN NOTED PAIN PRSNT: CPT | Performed by: FAMILY MEDICINE

## 2024-02-06 PROCEDURE — 96160 PT-FOCUSED HLTH RISK ASSMT: CPT | Performed by: FAMILY MEDICINE

## 2024-02-06 PROCEDURE — 3078F DIAST BP <80 MM HG: CPT | Performed by: FAMILY MEDICINE

## 2024-02-06 PROCEDURE — 1170F FXNL STATUS ASSESSED: CPT | Performed by: FAMILY MEDICINE

## 2024-02-06 PROCEDURE — 1159F MED LIST DOCD IN RCRD: CPT | Performed by: FAMILY MEDICINE

## 2024-02-06 NOTE — PROGRESS NOTES
Subjective:   Alfredo Guerra is a 68 year old male who presents for a Medicare Subsequent Annual Wellness visit (Pt already had Initial Annual Wellness) and scheduled follow up of multiple significant but stable problems.       History/Other:   Fall Risk Assessment:   He has been screened for Falls and is low risk.      Cognitive Assessment:   Abnormal  What day of the week is this?: Correct  What month is it?: Correct  What year is it?: Correct  Recall \"Ball\": Correct  Recall \"Flag\": Correct  Recall \"Tree\": Incorrect    Functional Ability/Status:   Alfredo Guerra has some abnormal functions as listed below:  He has Vision problems based on screening of functional status.       Depression Screening (PHQ-2/PHQ-9): PHQ-2 SCORE: 0  , done 2/6/2024        5 minutes spent screening and counseling for depression    Advanced Directives:   He does NOT have a Living Will. [Do you have a living will?: No]  He does NOT have a Power of  for Health Care. [Do you have a healthcare power of ?: No]  Discussed Advance Care Planning with patient (and family/surrogate if present). Standard forms made available to patient in After Visit Summary.      Patient Active Problem List   Diagnosis    Polyp of colon     Allergies:  He has No Known Allergies.    Current Medications:  Outpatient Medications Marked as Taking for the 2/6/24 encounter (Office Visit) with Irina Arroyo MD   Medication Sig    PEG 3350-KCl-Na Bicarb-NaCl (TRILYTE) 420 g Oral Recon Soln Take prep as directed by gastro office. May substitute with Trilyte/generic equivalent if needed.    NON FORMULARY Take by mouth daily. Bioxtron supplement for arthritis pain.    LISINOPRIL 20 MG Oral Tab TAKE 1 TABLET(20 MG) BY MOUTH DAILY       Medical History:  He  has a past medical history of Esophageal reflux, Essential hypertension, High blood pressure, and Tubular adenoma of colon (2021).  Surgical History:  He  has a past surgical history that includes other  surgical history and colonoscopy (N/A, 7/28/2021).   Family History:  His family history includes Diabetes in his mother; No Known Problems in his brother, daughter, father, maternal grandfather, maternal grandmother, paternal grandfather, paternal grandmother, sister, and son.  Social History:  He  reports that he has never smoked. He has never used smokeless tobacco. He reports that he does not currently use alcohol. He reports that he does not use drugs.    Tobacco:  He has never smoked tobacco.    CAGE Alcohol Screen:   CAGE screening score of 0 on 2/6/2024, showing low risk of alcohol abuse.      Patient Care Team:  Christelle Toussaint as Physical Therapist (Physical Therapy)    Review of Systems  GENERAL: feels well otherwise  SKIN: denies any unusual skin lesions  EYES: denies blurred vision or double vision  HEENT: denies nasal congestion, sinus pain or ST  LUNGS: denies shortness of breath with exertion  CARDIOVASCULAR: denies chest pain on exertion  GI: denies abdominal pain, denies heartburn  : 1 per night nocturia, no complaint of urinary incontinence  MUSCULOSKELETAL: denies back pain  NEURO: denies headaches  PSYCHE: denies depression or anxiety  HEMATOLOGIC: denies hx of anemia  ENDOCRINE: denies thyroid history  ALL/ASTHMA: denies hx of allergy or asthma    Objective:   Physical Exam  General Appearance:  Alert, cooperative, no distress, appears stated age   Head:  Normocephalic, without obvious abnormality, atraumatic   Eyes:  PERRL, conjunctiva/corneas clear, EOM's intact, both eyes   Ears:  Normal TM's and external ear canals, both ears   Nose: Nares normal, septum midline, mucosa normal, no drainage or sinus tenderness   Throat: Lips, mucosa, and tongue normal; teeth and gums normal   Neck: Supple, symmetrical, trachea midline, no adenopathy, thyroid: not enlarged, symmetric, no tenderness/mass/nodules, no carotid bruit or JVD   Back:   Symmetric, no curvature, ROM normal, no CVA tenderness    Lungs:   Clear to auscultation bilaterally, respirations unlabored   Chest Wall:  No tenderness or deformity   Heart:  Regular rate and rhythm, S1, S2 normal, no murmur, rub or gallop   Abdomen:   Soft, non-tender, bowel sounds active all four quadrants,  no masses, no organomegaly   Genitalia: Normal male   Rectal: Normal tone, normal prostate, no masses or tenderness   Extremities: Extremities normal, atraumatic, no cyanosis or edema   Pulses: 2+ and symmetric   Skin: Skin color, texture, turgor normal, no rashes or lesions   Lymph nodes: Cervical, supraclavicular, and axillary nodes normal   Neurologic: Normal     /79 (BP Location: Right arm, Patient Position: Sitting, Cuff Size: adult)   Pulse 110   Resp 20   Ht 5' 10\" (1.778 m)   Wt 202 lb 3.2 oz (91.7 kg)   SpO2 96%   BMI 29.01 kg/m²  Estimated body mass index is 29.01 kg/m² as calculated from the following:    Height as of this encounter: 5' 10\" (1.778 m).    Weight as of this encounter: 202 lb 3.2 oz (91.7 kg).    Medicare Hearing Assessment:   Hearing Screening    Time taken: 2/6/2024  2:24 PM  Screening Method: Questionnaire  I have a problem hearing over the telephone: No I have trouble following the conversations when two or more people are talking at the same time: No   I have trouble understanding things on the TV: No I have to strain to understand conversations: No   I have to worry about missing the telephone ring or doorbell: No I have trouble hearing conversations in a noisy background such as a crowded room or restaurant: No   I get confused about where sounds come from: No I misunderstand some words in a sentence and need to ask people to repeat themselves: No   I especially have trouble understanding the speech of women and children: No I have trouble understanding the speaker in a large room such as at a meeting or place of Yazdanism: No   Many people I talk to seem to mumble (or don't speak clearly): No People get annoyed because I  misunderstand what they say: No   I misunderstand what others are saying and make inappropriate responses: No I avoid social activities because I cannot hear well and fear I will reply improperly: No   Family members and friends have told me they think I may have hearing loss: No             Visual Acuity:   Right Eye Visual Acuity: Uncorrected Right Eye Chart Acuity: 20/30   Left Eye Visual Acuity: Uncorrected Left Eye Chart Acuity: 20/30   Both Eyes Visual Acuity: Uncorrected Both Eyes Chart Acuity: 20/30   Able To Tolerate Visual Acuity: Yes        Assessment & Plan:   Alfredo Guerra is a 68 year old male who presents for a Medicare Assessment.     1. Essential hypertension (Primary)  Controlled cpm  2. Abnormal colonoscopy  Patient has apt fopr colonoscopy in 4/2024  -     [facility] GI Telephone Colon Screen; Future; Expected date: 02/13/2024  3. Patient also needed clearance for catarct surgery   Patient clear   The patient indicates understanding of these issues and agrees to the plan.  Reinforced healthy diet, lifestyle, and exercise.      No follow-ups on file.     Irina Arroyo MD, 2/6/2024     Supplementary Documentation:   General Health:  In the past six months, have you lost more than 10 pounds without trying?: 2 - No  Has your appetite been poor?: No  Type of Diet: Balanced  How does the patient maintain a good energy level?: Daily Walks  How would you describe your daily physical activity?: Moderate  How would you describe your current health state?: Good  How do you maintain positive mental well-being?: Visiting Family  On a scale of 0 to 10, with 0 being no pain and 10 being severe pain, what is your pain level?: 1 - (Mild)  In the past six months, have you experienced urine leakage?: 1-Yes  At any time do you feel concerned for the safety/well-being of yourself and/or your children, in your home or elsewhere?: No  Have you had any immunizations at another office such as Influenza, Hepatitis B, Tetanus, or  Pneumococcal?: Yes        Alfredo Guerra's SCREENING SCHEDULE   Tests on this list are recommended by your physician but may not be covered, or covered at this frequency, by your insurer.   Please check with your insurance carrier before scheduling to verify coverage.   PREVENTATIVE SERVICES FREQUENCY &  COVERAGE DETAILS LAST COMPLETION DATE   Diabetes Screening    Fasting Blood Sugar / Glucose    One screening every 12 months if never tested or if previously tested but not diagnosed with pre-diabetes   One screening every 6 months if diagnosed with pre-diabetes Lab Results   Component Value Date    GLU 94 08/30/2023        Cardiovascular Disease Screening    Lipid Panel  Cholesterol  Lipoprotein (HDL)  Triglycerides Covered every 5 years for all Medicare beneficiaries without apparent signs or symptoms of cardiovascular disease Lab Results   Component Value Date    CHOLEST 172 08/30/2023    HDL 34 (L) 08/30/2023    LDL 93 08/30/2023    TRIG 266 (H) 08/30/2023         Electrocardiogram (EKG)   Covered if needed at Welcome to Medicare, and non-screening if indicated for medical reasons -      Ultrasound Screening for Abdominal Aortic Aneurysm (AAA) Covered once in a lifetime for one of the following risk factors    Men who are 65-75 years old and have ever smoked    Anyone with a family history -     Colorectal Cancer Screening  Covered for ages 50-85; only need ONE of the following:    Colonoscopy   Covered every 10 years    Covered every 2 years if patient is at high risk or previous colonoscopy was abnormal 07/28/2021    Health Maintenance   Topic Date Due    Colorectal Cancer Screening  07/28/2022       Flexible Sigmoidoscopy   Covered every 4 years -    Fecal Occult Blood Test Covered annually -   Prostate Cancer Screening    Prostate-Specific Antigen (PSA) Annually No results found for: \"PSA\"  Health Maintenance   Topic Date Due    PSA  Never done      Immunizations    Influenza Covered once per flu  season  Please get every year 10/25/2023  No recommendations at this time    Pneumococcal Each vaccine (Azvibwg69 & [id number]) covered once after 65 Prevnar 13: -    [id number]: -     No recommendations at this time    Hepatitis B One screening covered for patients with certain risk factors   -  No recommendations at this time    Tetanus Toxoid Not covered by Medicare Part B unless medically necessary (cut with metal); may be covered with your pharmacy prescription benefits -    Tetanus, Diptheria and Pertusis TD and TDaP Not covered by Medicare Part B -  No recommendations at this time    Zoster Not covered by Medicare Part B; may be covered with your pharmacy  prescription benefits -  Zoster Vaccines(1 of 2) Never done     Annual Monitoring of Persistent Medications (ACE/ARB, digoxin diuretics, anticonvulsants)    Potassium Annually Lab Results   Component Value Date    K 4.3 08/30/2023         Creatinine   Annually Lab Results   Component Value Date    CREATSERUM 0.92 08/30/2023         BUN Annually Lab Results   Component Value Date    BUN 11 08/30/2023       Drug Serum Conc Annually No results found for: \"DIGOXIN\", \"DIG\", \"VALP\"

## 2024-02-06 NOTE — TELEPHONE ENCOUNTER
To Dr. Jeff Simental    Alfredo Fischer is patient of mine and he is clear for cataract surgery   No further testing is needed at this time

## 2024-02-06 NOTE — PATIENT INSTRUCTIONS
Alfredo Guerra's SCREENING SCHEDULE   Tests on this list are recommended by your physician but may not be covered, or covered at this frequency, by your insurer.   Please check with your insurance carrier before scheduling to verify coverage.   PREVENTATIVE SERVICES FREQUENCY &  COVERAGE DETAILS LAST COMPLETION DATE   Diabetes Screening    Fasting Blood Sugar / Glucose    One screening every 12 months if never tested or if previously tested but not diagnosed with pre-diabetes   One screening every 6 months if diagnosed with pre-diabetes Lab Results   Component Value Date    GLU 94 08/30/2023        Cardiovascular Disease Screening    Lipid Panel  Cholesterol  Lipoprotein (HDL)  Triglycerides Covered every 5 years for all Medicare beneficiaries without apparent signs or symptoms of cardiovascular disease Lab Results   Component Value Date    CHOLEST 172 08/30/2023    HDL 34 (L) 08/30/2023    LDL 93 08/30/2023    TRIG 266 (H) 08/30/2023         Electrocardiogram (EKG)   Covered if needed at Welcome to Medicare, and non-screening if indicated for medical reasons -      Ultrasound Screening for Abdominal Aortic Aneurysm (AAA) Covered once in a lifetime for one of the following risk factors   • Men who are 65-75 years old and have ever smoked   • Anyone with a family history -     Colorectal Cancer Screening  Covered for ages 50-85; only need ONE of the following:    Colonoscopy   Covered every 10 years    Covered every 2 years if patient is at high risk or previous colonoscopy was abnormal 07/28/2021    Health Maintenance   Topic Date Due   • Colorectal Cancer Screening  07/28/2022       Flexible Sigmoidoscopy   Covered every 4 years -    Fecal Occult Blood Test Covered annually -   Prostate Cancer Screening    Prostate-Specific Antigen (PSA) Annually No results found for: \"PSA\"  Health Maintenance   Topic Date Due   • PSA  Never done      Immunizations    Influenza Covered once per flu season  Please get every year  10/25/2023  No recommendations at this time    Pneumococcal Each vaccine (Ktrnqkv32 & Wobjdmisq11) covered once after 65 Prevnar 13: -    [id number]: -     No recommendations at this time    Hepatitis B One screening covered for patients with certain risk factors   -  No recommendations at this time    Tetanus Toxoid Not covered by Medicare Part B unless medically necessary (cut with metal); may be covered with your pharmacy prescription benefits -    Tetanus, Diptheria and Pertusis TD and TDaP Not covered by Medicare Part B -  No recommendations at this time    Zoster Not covered by Medicare Part B; may be covered with your pharmacy  prescription benefits -  Zoster Vaccines(1 of 2) Never done     Annual Monitoring of Persistent Medications (ACE/ARB, digoxin diuretics, anticonvulsants)    Potassium Annually Lab Results   Component Value Date    K 4.3 08/30/2023         Creatinine   Annually Lab Results   Component Value Date    CREATSERUM 0.92 08/30/2023         BUN Annually Lab Results   Component Value Date    BUN 11 08/30/2023       Drug Serum Conc Annually No results found for: \"DIGOXIN\", \"DIG\", \"VALP\"

## 2024-02-09 NOTE — TELEPHONE ENCOUNTER
Per Joyce @ [phone], she needs the note from the visit,  the whole H&P of patient fax to [phone]. Surgery is on 02/21/2024.

## 2024-04-04 ENCOUNTER — TELEPHONE (OUTPATIENT)
Facility: CLINIC | Age: 69
End: 2024-04-04

## 2024-04-04 NOTE — TELEPHONE ENCOUNTER
Patient has upcoming cln on 4/9 and contacted [facility]. Patient was informed they no longer have the script, please send new script and update through [employer] in english is ok, thanks.

## 2024-04-09 ENCOUNTER — HOSPITAL ENCOUNTER (OUTPATIENT)
Facility: HOSPITAL | Age: 69
Setting detail: HOSPITAL OUTPATIENT SURGERY
Discharge: HOME | End: 2024-04-09
Attending: INTERNAL MEDICINE | Admitting: INTERNAL MEDICINE
Payer: MEDICARE

## 2024-04-09 VITALS
WEIGHT: 206 LBS | HEART RATE: 67 BPM | SYSTOLIC BLOOD PRESSURE: 123 MMHG | RESPIRATION RATE: 14 BRPM | DIASTOLIC BLOOD PRESSURE: 89 MMHG | BODY MASS INDEX: 28.84 KG/M2 | HEIGHT: 71 IN | OXYGEN SATURATION: 98 %

## 2024-04-09 DIAGNOSIS — Z86.010 HISTORY OF COLON POLYPS: ICD-10-CM

## 2024-04-09 PROBLEM — Q43.8 TORTUOUS COLON: Status: ACTIVE | Noted: 2024-04-09

## 2024-04-09 PROCEDURE — 0DBK8ZX EXCISION OF ASCENDING COLON, VIA NATURAL OR ARTIFICIAL OPENING ENDOSCOPIC, DIAGNOSTIC: ICD-10-PCS | Performed by: INTERNAL MEDICINE

## 2024-04-09 PROCEDURE — 0DBL8ZX EXCISION OF TRANSVERSE COLON, VIA NATURAL OR ARTIFICIAL OPENING ENDOSCOPIC, DIAGNOSTIC: ICD-10-PCS | Performed by: INTERNAL MEDICINE

## 2024-04-09 PROCEDURE — G0500 MOD SEDAT ENDO SERVICE >5YRS: HCPCS | Performed by: INTERNAL MEDICINE

## 2024-04-09 PROCEDURE — 45385 COLONOSCOPY W/LESION REMOVAL: CPT | Performed by: INTERNAL MEDICINE

## 2024-04-09 PROCEDURE — 0DBM8ZX EXCISION OF DESCENDING COLON, VIA NATURAL OR ARTIFICIAL OPENING ENDOSCOPIC, DIAGNOSTIC: ICD-10-PCS | Performed by: INTERNAL MEDICINE

## 2024-04-09 RX ORDER — SODIUM CHLORIDE, SODIUM LACTATE, POTASSIUM CHLORIDE, CALCIUM CHLORIDE 600; 310; 30; 20 MG/100ML; MG/100ML; MG/100ML; MG/100ML
INJECTION, SOLUTION INTRAVENOUS CONTINUOUS
Status: DISCONTINUED | OUTPATIENT
Start: 2024-04-09 | End: 2024-04-09

## 2024-04-09 RX ORDER — SODIUM CHLORIDE 0.9 % (FLUSH) 0.9 %
10 SYRINGE (ML) INJECTION AS NEEDED
Status: DISCONTINUED | OUTPATIENT
Start: 2024-04-09 | End: 2024-04-09

## 2024-04-09 RX ORDER — MIDAZOLAM HYDROCHLORIDE 1 MG/ML
INJECTION INTRAMUSCULAR; INTRAVENOUS
Status: DISCONTINUED | OUTPATIENT
Start: 2024-04-09 | End: 2024-04-09

## 2024-04-09 RX ORDER — MIDAZOLAM HYDROCHLORIDE 1 MG/ML
1 INJECTION INTRAMUSCULAR; INTRAVENOUS EVERY 5 MIN PRN
Status: DISCONTINUED | OUTPATIENT
Start: 2024-04-09 | End: 2024-04-09

## 2024-04-09 NOTE — OPERATIVE REPORT
COLONOSCOPY REPORT    Alfredo Guerra     1955 Age 68 year old   PCP No primary care provider on file. Endoscopist Nile Hoover MD     Date of procedure: 24    Procedure: Colonoscopy w/cold biopsy + snare polypectomy    Pre-operative diagnosis: Screening, hx of polyps    Post-operative diagnosis: colon polyps x14, internal hemorrhoids, tortuous colon    Medications: Versed 8 mg IV push.  Fentanyl 100 mcg IV push. [Per my order and under my supervision, the patient was sedated with intermittent intravenous doses of versed and fentanyl. The vital signs were monitored and recorded by an experienced RN. The procedure started after the patient was adequately sedated. Total time for moderate conscious sedation was 40 minutes].    Withdrawal time: 27 minutes    Procedure:  Informed consent was obtained from the patient after the risks of the procedure were discussed, including but not limited to bleeding, perforation, aspiration, infection, or possibility of a missed lesion. After discussions of the risks/benefits and alternatives to this procedure, as well as the planned sedation, the patient was placed in the left lateral decubitus position and begun on continuous blood pressure pulse oximetry and EKG monitoring and this was maintained throughout the procedure. Once an adequate level of sedation was obtained a digital rectal exam was completed. Then the lubricated tip of the [id number] diagnostic video colonoscope was inserted and advanced WITH difficulty using water immersion + external pressure + supine position. The cecum was identified by localizing the trifold, the appendix and the ileocecal valve. Withdrawal was begun with thorough washing and careful examination of the colonic walls and folds. A routine second examination of the cecum/ascending colon was performed. Photodocumentation was obtained. The bowel prep was good. Views of the colon were good with washing. I then carefully  withdrew the instrument from the patient who tolerated the procedure well.     Complications: none.    Findings:   1. 14 polyp(s) noted as follows:      A. A. Ascending colon: 6 polyps, all sessile, ranging in size from 6-9mm s/p cold snare polypectomy and retrieved.      B. Transverse colon: 3 polyps, all sessile, ranging in size from 2-9mm. Two diminutive polyps removed with cold biopsy and the other polyp removed with cold snare.      C. Descending colon: 5 polyps, all sessile, ranging in size from 6-9mm s/p cold snare polypectomy and retrieved.    2. Diverticulosis: none.    3. Terminal ileum: the visualized mucosa appeared normal.    4. The colonic mucosa throughout the colon showed normal vascular pattern, without evidence of angioectasias or inflammation. Tortuous sigmoid colon.    5. A retroflexed view of the rectum revealed small internal hemorrhoids.    6. ADRY: normal rectal tone, no masses palpated.     Impression:   14 small colon polyps removed.  Small internal hemorrhoids.  Tortuous colon - required water immersion/suctioning out air/then to enter cecum, used supine position + external ab pressure.     Recommend:  Await pathology. The interval for the next colonoscopy will be determined after reviewing pathology. If new signs or symptoms develop, colonoscopy may need to be repeated sooner.   Tolerated IV sedation well.  Avoid red meat intake.  High fiber diet.  Monitor for blood in the stool. If having more than just tinge of blood, call office or go to the ER.    >>>If tissue was obtained and you have not received your pathology results either by phone or letter within 2 weeks, please call our office at [phone].    Specimens: colon   Blood loss: <1 ml      ----------------------------------------------------------------------------------------------------------------------------------    INTERVAL FOR COLONOSCOPY:   - If there is no family history of colon cancer and no colon polyps identified in  an adequately prepped colon - colonoscopy should be repeated in 10 years. Various factors should be considered regarding repeat colonoscopy - including co-morbid conditions, ability to tolerate procedure with advanced age, and desire for repeat testing.   - If no colon polyps were identified and a positive family history of colon cancer - the colonoscopy should be repeated in 5 years.   - If colon polyps were removed, the colonoscopy should be repeated sooner depending on size/type/location of polyp.

## 2024-04-09 NOTE — PRE-SEDATION ASSESSMENT
Physician Pre-Sedation Assessment    Pre-Sedation Assessment:    Sedation History: Previous Sedation with No Complications and Airway Assessed    Cardiac: normal S1, S2  Respiratory: breath sounds clear bilaterally   Abdomen: soft, BS (+), non-tender    ASA Classification: 2. Patient with mild systemic disease    Plan: IV Sedation

## 2024-04-09 NOTE — DISCHARGE INSTRUCTIONS
Home Care Instructions for Colonoscopy with Sedation    Diet:  - Resume your regular diet as tolerated unless otherwise instructed.  - Start with light meals to minimize bloating.  - Do not drink alcohol today.    Medication:  - If you have questions about resuming your normal medications, please contact your Primary Care Physician.    Activities:  - Take it easy today. Do not return to work today.  - Do not drive today.  - Do not operate any machinery today (including kitchen equipment).    Colonoscopy:  - You may notice some rectal \"spotting\" (a little blood on the toilet tissue) for a day or two after the exam. This is normal.  - If you experience any rectal bleeding (not spotting), persistent tenderness or sharp severe abdominal pains, oral temperature over 100 degrees Fahrenheit, light-headedness or dizziness, or any other problems, contact your doctor.      **If unable to reach your doctor, please go to the [facility] Emergency Room**    - Your referring physician will receive a full report of your examination.  - If you do not hear from your doctor's office within two weeks of your biopsy, please call them for your results.    You may be able to see your laboratory results in [employer] between 4 and 7 business days.  In some cases, your physician may not have viewed the results before they are released to [employer].  If you have questions regarding your results contact the physician who ordered the test/exam by phone or via [employer] by choosing \"Ask a Medical Question.\"

## 2024-04-09 NOTE — H&P
History & Physical Examination    Patient Name: Alfredo Guerra  MRN: [id number]  CSN: [id number]  YOB: 1955    Diagnosis: screening for colon cancer    Medications Prior to Admission   Medication Sig Dispense Refill Last Dose    LISINOPRIL 20 MG Oral Tab TAKE 1 TABLET(20 MG) BY MOUTH DAILY 90 tablet 1 2024    [] polyethylene glycol, PEG 3350-KCl-NaBcb-NaCl-NaSulf, 236 g Oral Recon Soln Take 4,000 mL by mouth once for 1 dose. Take as directed by your Gastroenterology clinic. 4000 mL 0     PEG 3350-KCl-Na Bicarb-NaCl (TRILYTE) 420 g Oral Recon Soln Take prep as directed by gastro office. May substitute with Trilyte/generic equivalent if needed. 4000 mL 0     NON FORMULARY Take by mouth daily. Bioxtron supplement for arthritis pain.    at prn     Current Facility-Administered Medications   Medication Dose Route Frequency    sodium chloride 0.9% 0.9% flush injection 10 mL  10 mL Intravenous PRN    lactated ringers infusion   Intravenous Continuous    midazolam (Versed) 2 MG/2ML injection 1 mg  1 mg Intravenous Q5 Min PRN    fentaNYL (Sublimaze) 50 mcg/mL injection 25 mcg  25 mcg Intravenous Q5 Min PRN       Allergies: No Known Allergies    Past Medical History:   Diagnosis Date    Esophageal reflux     Essential hypertension     High blood pressure     Tubular adenoma of colon     10 polyps, repeat CLN in      Past Surgical History:   Procedure Laterality Date    COLONOSCOPY N/A 2021    Procedure: COLONOSCOPY;  Surgeon: SAHIL Hoover MD;  Location: [facility] ENDOSCOPY    LAPAROSCOPY,DIAGNOSTIC      OTHER SURGICAL HISTORY      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     TOTAL HIP REPLACEMENT       Family History   Problem Relation Age of Onset    No Known Problems Father     Diabetes Mother     No Known Problems Daughter     No Known Problems Son     No Known Problems Maternal Grandmother     No Known Problems Maternal Grandfather     No Known Problems Paternal Grandmother     No Known Problems Paternal  Grandfather     No Known Problems Sister     No Known Problems Brother      Social History     Tobacco Use    Smoking status: Never    Smokeless tobacco: Never   Substance Use Topics    Alcohol use: Not Currently       SYSTEM Check if Review is Normal Check if Physical Exam is Normal If not normal, please explain:   HEENT [X ] [ X]    NECK  [X ] [ X]    HEART [X ] [ X]    LUNGS [X ] [ X]    ABDOMEN [X ] [ X]    EXTREMITIES [X ] [ X]    OTHER        I have discussed the risks and benefits and alternatives of the procedure with the patient/family.  They understand and agree to proceed with plan of care.   I have reviewed the History and Physical done within the last 30 days.  Any changes noted above.    RENEE Hoover MD  [facility] - Gastroenterology  4/9/2024  2:19 PM

## 2024-04-25 ENCOUNTER — TELEPHONE (OUTPATIENT)
Dept: GASTROENTEROLOGY | Facility: CLINIC | Age: 69
End: 2024-04-25

## 2024-04-25 NOTE — TELEPHONE ENCOUNTER
I mailed out colonoscopy results letter to pt  Updated health maintenance  Entered into 1 yr CLN recall  Recall colon in 1 year by .  Colon done 4/09/2024      SAHIL Hoover MD  [facility] Clinical Staff  GI staff: please place recall for colonoscopy in 1 year

## 2024-05-24 NOTE — TELEPHONE ENCOUNTER
Patient presents today for pre op exam.    Medication Reconciliation Complete    Sandra Cortes LPN  5/24/2024 9:05 AM   Prep sent. Mychart sent to patient.

## 2024-08-21 ENCOUNTER — OFFICE VISIT (OUTPATIENT)
Dept: FAMILY MEDICINE CLINIC | Facility: CLINIC | Age: 69
End: 2024-08-21

## 2024-08-21 ENCOUNTER — LAB ENCOUNTER (OUTPATIENT)
Dept: LAB | Age: 69
End: 2024-08-21
Attending: FAMILY MEDICINE
Payer: MEDICARE

## 2024-08-21 VITALS
OXYGEN SATURATION: 95 % | SYSTOLIC BLOOD PRESSURE: 112 MMHG | DIASTOLIC BLOOD PRESSURE: 68 MMHG | HEIGHT: 71 IN | BODY MASS INDEX: 27.3 KG/M2 | HEART RATE: 75 BPM | TEMPERATURE: 98 F | WEIGHT: 195 LBS

## 2024-08-21 DIAGNOSIS — Z12.5 SCREENING FOR PROSTATE CANCER: Primary | ICD-10-CM

## 2024-08-21 DIAGNOSIS — I10 ESSENTIAL HYPERTENSION: ICD-10-CM

## 2024-08-21 DIAGNOSIS — R68.82 LIBIDO, DECREASED: ICD-10-CM

## 2024-08-21 DIAGNOSIS — Z12.5 SCREENING FOR PROSTATE CANCER: ICD-10-CM

## 2024-08-21 DIAGNOSIS — M54.2 NECK PAIN: ICD-10-CM

## 2024-08-21 LAB
ALBUMIN SERPL-MCNC: 4.3 G/DL (ref 3.2–4.8)
ALBUMIN/GLOB SERPL: 1.5 {RATIO} (ref 1–2)
ALP LIVER SERPL-CCNC: 85 U/L
ALT SERPL-CCNC: 30 U/L
ANION GAP SERPL CALC-SCNC: 6 MMOL/L (ref 0–18)
AST SERPL-CCNC: 29 U/L (ref ?–34)
BASOPHILS # BLD AUTO: 0.02 X10(3) UL (ref 0–0.2)
BASOPHILS NFR BLD AUTO: 0.4 %
BILIRUB SERPL-MCNC: 0.4 MG/DL (ref 0.2–1.1)
BUN BLD-MCNC: 9 MG/DL (ref 9–23)
BUN/CREAT SERPL: 10.5 (ref 10–20)
CALCIUM BLD-MCNC: 9.4 MG/DL (ref 8.7–10.4)
CHLORIDE SERPL-SCNC: 106 MMOL/L (ref 98–112)
CHOLEST SERPL-MCNC: 178 MG/DL (ref ?–200)
CO2 SERPL-SCNC: 25 MMOL/L (ref 21–32)
COMPLEXED PSA SERPL-MCNC: 1.36 NG/ML (ref ?–4)
CREAT BLD-MCNC: 0.86 MG/DL
DEPRECATED RDW RBC AUTO: 38.8 FL (ref 35.1–46.3)
EGFRCR SERPLBLD CKD-EPI 2021: 94 ML/MIN/1.73M2 (ref 60–?)
EOSINOPHIL # BLD AUTO: 0.09 X10(3) UL (ref 0–0.7)
EOSINOPHIL NFR BLD AUTO: 1.7 %
ERYTHROCYTE [DISTWIDTH] IN BLOOD BY AUTOMATED COUNT: 11.9 % (ref 11–15)
FASTING PATIENT LIPID ANSWER: NO
FASTING STATUS PATIENT QL REPORTED: NO
GLOBULIN PLAS-MCNC: 2.9 G/DL (ref 2–3.5)
GLUCOSE BLD-MCNC: 84 MG/DL (ref 70–99)
HCT VFR BLD AUTO: 42.5 %
HDLC SERPL-MCNC: 30 MG/DL (ref 40–59)
HGB BLD-MCNC: 15 G/DL
IMM GRANULOCYTES # BLD AUTO: 0.03 X10(3) UL (ref 0–1)
IMM GRANULOCYTES NFR BLD: 0.6 %
LDLC SERPL CALC-MCNC: 73 MG/DL (ref ?–100)
LYMPHOCYTES # BLD AUTO: 1.71 X10(3) UL (ref 1–4)
LYMPHOCYTES NFR BLD AUTO: 31.7 %
MCH RBC QN AUTO: 31.3 PG (ref 26–34)
MCHC RBC AUTO-ENTMCNC: 35.3 G/DL (ref 31–37)
MCV RBC AUTO: 88.7 FL
MONOCYTES # BLD AUTO: 0.75 X10(3) UL (ref 0.1–1)
MONOCYTES NFR BLD AUTO: 13.9 %
NEUTROPHILS # BLD AUTO: 2.79 X10 (3) UL (ref 1.5–7.7)
NEUTROPHILS # BLD AUTO: 2.79 X10(3) UL (ref 1.5–7.7)
NEUTROPHILS NFR BLD AUTO: 51.7 %
NONHDLC SERPL-MCNC: 148 MG/DL (ref ?–130)
OSMOLALITY SERPL CALC.SUM OF ELEC: 282 MOSM/KG (ref 275–295)
PLATELET # BLD AUTO: 243 10(3)UL (ref 150–450)
POTASSIUM SERPL-SCNC: 4.3 MMOL/L (ref 3.5–5.1)
PROT SERPL-MCNC: 7.2 G/DL (ref 5.7–8.2)
RBC # BLD AUTO: 4.79 X10(6)UL
SODIUM SERPL-SCNC: 137 MMOL/L (ref 136–145)
TRIGL SERPL-MCNC: 479 MG/DL (ref 30–149)
VLDLC SERPL CALC-MCNC: 75 MG/DL (ref 0–30)
WBC # BLD AUTO: 5.4 X10(3) UL (ref 4–11)

## 2024-08-21 PROCEDURE — 80053 COMPREHEN METABOLIC PANEL: CPT

## 2024-08-21 PROCEDURE — 1126F AMNT PAIN NOTED NONE PRSNT: CPT | Performed by: FAMILY MEDICINE

## 2024-08-21 PROCEDURE — 1159F MED LIST DOCD IN RCRD: CPT | Performed by: FAMILY MEDICINE

## 2024-08-21 PROCEDURE — 1160F RVW MEDS BY RX/DR IN RCRD: CPT | Performed by: FAMILY MEDICINE

## 2024-08-21 PROCEDURE — 99214 OFFICE O/P EST MOD 30 MIN: CPT | Performed by: FAMILY MEDICINE

## 2024-08-21 PROCEDURE — 85025 COMPLETE CBC W/AUTO DIFF WBC: CPT

## 2024-08-21 PROCEDURE — 3008F BODY MASS INDEX DOCD: CPT | Performed by: FAMILY MEDICINE

## 2024-08-21 PROCEDURE — 80061 LIPID PANEL: CPT

## 2024-08-21 PROCEDURE — 3078F DIAST BP <80 MM HG: CPT | Performed by: FAMILY MEDICINE

## 2024-08-21 PROCEDURE — 3074F SYST BP LT 130 MM HG: CPT | Performed by: FAMILY MEDICINE

## 2024-08-21 PROCEDURE — 36415 COLL VENOUS BLD VENIPUNCTURE: CPT

## 2024-08-21 RX ORDER — TAMSULOSIN HYDROCHLORIDE 0.4 MG/1
0.4 CAPSULE ORAL NIGHTLY
COMMUNITY
Start: 2024-01-18 | End: 2024-08-21

## 2024-08-22 RX ORDER — FENOFIBRATE 145 MG/1
145 TABLET, COATED ORAL DAILY
Qty: 90 TABLET | Refills: 1 | Status: SHIPPED | OUTPATIENT
Start: 2024-08-22

## 2024-09-04 NOTE — PROGRESS NOTES
[facility]  Initial Office Consultation    HPI:   Alfredo Guerra is a 69 year old male with PMHx of Colon Polyps, GERD, and HTN here today for nocturia.     Patient complains of nocturia.  Nocturia x 2-3  Frequency q every 5 hours during the day  Duration: 1 year      denies symptoms of fever.  denies dysuria  denies hematuria.  denies incontinence.   denies uti's    IPSS 7 (2/1/0/0/1/1/2) with QoL Score of 2. PVR 81 mL.    Caffeine intake:  1 in morning  Bowel function:  regular    Prior Treatment:    Medications:  no   Surgical therapies:  no    Family history   Prostate cancer:  2 brothers   Enlarged prostate:  yes, both brothers      Past Medical History:    Colon polyps    >10 polyps, repeat CLN In 2025    Esophageal reflux    Essential hypertension    High blood pressure    Tubular adenoma of colon    10 polyps, repeat CLN in 2022     Past Surgical History:   Procedure Laterality Date    Colonoscopy N/A 07/28/2021    Procedure: COLONOSCOPY;  Surgeon: SAHIL Hoover MD;  Location: [facility] ENDOSCOPY    Colonoscopy N/A 4/9/2024    Procedure: COLONOSCOPY;  Surgeon: SAHIL Hoover MD;  Location: [facility] ENDOSCOPY    Laparoscopy,diagnostic      Other surgical history      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     Total hip replacement       Family History   Problem Relation Age of Onset    No Known Problems Father     Diabetes Mother     No Known Problems Daughter     No Known Problems Son     No Known Problems Maternal Grandmother     No Known Problems Maternal Grandfather     No Known Problems Paternal Grandmother     No Known Problems Paternal Grandfather     No Known Problems Sister     No Known Problems Brother      Social History     Socioeconomic History    Marital status:    Tobacco Use    Smoking status: Never    Smokeless tobacco: Never   Vaping Use    Vaping status: Never Used   Substance and Sexual Activity    Alcohol use: Not Currently    Drug use: Never     Current Outpatient Medications   Medication  Sig Dispense Refill    fenofibrate 145 MG Oral Tab Take 1 tablet (145 mg total) by mouth daily. 90 tablet 1    LISINOPRIL 20 MG Oral Tab TAKE 1 TABLET(20 MG) BY MOUTH DAILY 90 tablet 1       Allergies: Patient has no known allergies.    REVIEW OF SYSTEMS:  Review of Systems   All other systems reviewed and are negative.        EXAM:  There were no vitals taken for this visit.    Physical Exam  Constitutional:       Appearance: Normal appearance.   HENT:      Head: Normocephalic.      Mouth/Throat:      Mouth: Mucous membranes are moist.   Pulmonary:      Effort: Pulmonary effort is normal.   Abdominal:      General: Abdomen is flat.      Palpations: Abdomen is soft.   Genitourinary:     Prostate: Enlarged. Not tender and no nodules present.      Rectum: Normal.   Skin:     General: Skin is warm and dry.   Neurological:      Mental Status: He is alert and oriented to person, place, and time.   Psychiatric:         Mood and Affect: Mood normal.         Behavior: Behavior normal.         Thought Content: Thought content normal.         Judgment: Judgment normal.          LABS:  No results found for: \"PSA\", \"QPSA\", \"TOTPSASCREEN\"  Lab Results   Component Value Date    WBC 5.4 08/21/2024    RBC 4.79 08/21/2024    HGB 15.0 08/21/2024    HCT 42.5 08/21/2024    MCV 88.7 08/21/2024    MCH 31.3 08/21/2024    MCHC 35.3 08/21/2024    RDW 11.9 08/21/2024    .0 08/21/2024     Lab Results   Component Value Date    GLU 84 08/21/2024    BUN 9 08/21/2024    BUNCREA 10.5 08/21/2024    CREATSERUM 0.86 08/21/2024    ANIONGAP 6 08/21/2024    GFRNAA 92 01/27/2021    GFRAA 106 01/27/2021    CA 9.4 08/21/2024     08/21/2024    K 4.3 08/21/2024     08/21/2024    CO2 25.0 08/21/2024     No results found for: \"PTP\", \"PT\", \"INR\"    IMAGING:  No results found.    IMPRESSION:  BPH with LUTS    PLAN:  - Begin Tamsulosin 0.4 mg at bedtime  - Follow-up in 3 months for symptom check    ALPHONSO Cordeor  9/5/2024

## 2024-09-05 ENCOUNTER — OFFICE VISIT (OUTPATIENT)
Dept: SURGERY | Facility: CLINIC | Age: 69
End: 2024-09-05

## 2024-09-05 DIAGNOSIS — R68.82 LOW LIBIDO: Primary | ICD-10-CM

## 2024-09-05 PROCEDURE — 1159F MED LIST DOCD IN RCRD: CPT

## 2024-09-05 PROCEDURE — 1160F RVW MEDS BY RX/DR IN RCRD: CPT

## 2024-09-05 PROCEDURE — 99203 OFFICE O/P NEW LOW 30 MIN: CPT

## 2024-09-05 RX ORDER — TAMSULOSIN HYDROCHLORIDE 0.4 MG/1
0.4 CAPSULE ORAL DAILY
Qty: 90 CAPSULE | Refills: 3 | Status: SHIPPED | OUTPATIENT
Start: 2024-09-05 | End: 2025-08-31

## 2024-09-17 ENCOUNTER — TELEPHONE (OUTPATIENT)
Dept: PHYSICAL THERAPY | Facility: HOSPITAL | Age: 69
End: 2024-09-17

## 2024-09-18 ENCOUNTER — OFFICE VISIT (OUTPATIENT)
Dept: PHYSICAL THERAPY | Facility: HOSPITAL | Age: 69
End: 2024-09-18
Attending: FAMILY MEDICINE
Payer: MEDICARE

## 2024-09-18 DIAGNOSIS — M54.2 NECK PAIN: Primary | ICD-10-CM

## 2024-09-18 PROCEDURE — 97161 PT EVAL LOW COMPLEX 20 MIN: CPT | Performed by: PHYSICAL THERAPIST

## 2024-09-18 PROCEDURE — 97110 THERAPEUTIC EXERCISES: CPT | Performed by: PHYSICAL THERAPIST

## 2024-09-18 PROCEDURE — 97140 MANUAL THERAPY 1/> REGIONS: CPT | Performed by: PHYSICAL THERAPIST

## 2024-09-18 NOTE — PROGRESS NOTES
SPINE EVALUATION:     Diagnosis:   Neck pain (M54.2)       Referring Provider: Irina Arroyo  Date of Evaluation:    9/18/2024    Precautions:  HTN Next MD visit:   none scheduled  Date of Surgery: n/a     PATIENT SUMMARY   Alfredo Guerra is a 69 year old male who presents to therapy today with complaints of neck pain that occurred 6 months ago without DESTINI. Turning his head makes his pain worse while resting and taking his Tylenol medication makes his pain better. He denies N/T and no imaging was performed at this time.   Pt describes pain level current 3/10, at best 0/10, at worst 5/10.   Current functional limitations include difficulty turning his head and performing in his backyard.     Alfredo describes prior level of function as independent. Pt goals include turning his head and performing in his backyard with less pain and difficulty.  Past medical history was reviewed with Alfredo. He has a past medical history of Colon polyps (2024), Esophageal reflux, Essential hypertension, High blood pressure, and Tubular adenoma of colon (2021).   Pt denies diplopia, dysarthria, dysphasia, dizziness, drop attacks, bowel/bladder changes, saddle anesthesia, and EPHRAIM LE N/T.    ASSESSMENT  Alfredo presents to physical therapy evaluation with primary c/o neck pain. The results of the objective tests and measures show decreased cervical and shoulder ROM, hypomobility and TTP in the cervical spine, decreased UE scapular strength, and impaired posture. Functional deficits include but are not limited to difficulty turning his head and performing in his backyard.  Signs and symptoms are consistent with diagnosis of cervicalgia. Pt and PT discussed evaluation findings, pathology, POC and HEP.  Pt voiced understanding and performs HEP correctly without reported pain. Skilled Physical Therapy is medically necessary to address the above impairments and reach functional goals.     OBJECTIVE:   Observation/Posture: FHP   Neuro Screen:  Bilat light touch is intact     Cervical AROM: (* denotes performed with pain)  Flexion: 45  Extension: 35*  Sidebending: R 35*; L 35*  Rotation: R 50*; L 50*    Accessory motion: Hypomobility with PA glides in the cervical spine   Palpation: TTP with PA glides in the cervical spine    Strength: (* denotes performed with pain)  UE/Scapular   Shoulder Flex: R 4/5, L 4/5  Shoulder ABD (C5): R 4/5, L 4/5  Biceps (C6): R 5/5, L 5/5    Rhomboids: R 4/5, L 4/5  Mid trap: R 4/5; L 4/5  Low trap: R 3/5; L 3/5     Special tests:   Spurling's: Negative     Gait: pt ambulates on level ground with normal mechanics.    Today’s Treatment and Response:   Pt education was provided on exam findings, treatment diagnosis, treatment plan, expectations, and prognosis. Pt was also provided recommendations for activity modifications  Patient was instructed in and issued a HEP for: upper trap stretch, levator scapulae stretch, & cervical rotation AAROM with towel     TE:  upper trap stretch 3x30s ea bilat   levator scapulae stretch 3x30s ea bilat  cervical rotation AAROM with towel 3x10 ea bilat     MT: STM in the cervical muscles; PA glides in the cervical spine     Charges: PT Eval Low Complexity, 1 TE, 1 MT      Total Timed Treatment: 30 min     Total Treatment Time: 45 min     Based on clinical rationale and outcome measures, this evaluation involved Low Complexity decision making due to 1-2 personal factors/comorbidities, 3 body structures involved/activity limitations, and evolving symptoms including changing pain levels.  PLAN OF CARE:    Goals: (to be met in 8 visits)   Pt will improve cervical AROM flexion by 5 degrees to improve tolerance for looking down to tie shoes   Pt will improve cervical AROM extension by 5 degrees to improve tolerance for putting dishes into overhead cabinets   Pt will improve cervical AROM rotation to >50 degrees to improve tolerance for turning head to check blind spot while driving  Pt will have  improved thoracic PA mobility to WNL to improve cervical ROM as well as promote upright posturing and decreased pain with working the yard   Pt will improve postural strength (mid/low trap) to 4/5 to promote improved upright posturing and decreased pain with working on the yard    Pt will be independent and compliant with comprehensive HEP to maintain progress achieved in PT      Frequency / Duration: Patient will be seen for 2 x/week or a total of 8 visits over a 90 day period. Treatment will include: Manual Therapy, Neuromuscular Re-education, Therapeutic Activities, Therapeutic Exercise, and Home Exercise Program instruction    Education or treatment limitation: None  Rehab Potential:good    Neck Disability Index Score  Score: 8 % (9/18/2024 12:45 PM)      Patient/Family/Caregiver was advised of these findings, precautions, and treatment options and has agreed to actively participate in planning and for this course of care.    Thank you for your referral. Please co-sign or sign and return this letter via fax as soon as possible to [phone]. If you have any questions, please contact me at Dept: [phone]    Sincerely,  Electronically signed by therapist: Arturo Bosch, PT    Physician's certification required: Yes  I certify the need for these services furnished under this plan of treatment and while under my care.    X___________________________________________________ Date____________________    Certification From: 9/18/2024  To:12/17/2024

## 2024-09-20 ENCOUNTER — OFFICE VISIT (OUTPATIENT)
Dept: PHYSICAL THERAPY | Facility: HOSPITAL | Age: 69
End: 2024-09-20
Attending: FAMILY MEDICINE
Payer: MEDICARE

## 2024-09-20 PROCEDURE — 97112 NEUROMUSCULAR REEDUCATION: CPT | Performed by: PHYSICAL THERAPIST

## 2024-09-20 PROCEDURE — 97140 MANUAL THERAPY 1/> REGIONS: CPT | Performed by: PHYSICAL THERAPIST

## 2024-09-20 PROCEDURE — 97110 THERAPEUTIC EXERCISES: CPT | Performed by: PHYSICAL THERAPIST

## 2024-09-20 NOTE — PROGRESS NOTES
Diagnosis:   Neck pain (M54.2)       Referring Provider: Irina Arroyo  Date of Evaluation:    9/18/2024     Precautions:  HTN Next MD visit:   none scheduled  Date of Surgery: n/a   Insurance Primary/Secondary: [employer] [facility] / N/A     # Auth Visits: 8            Subjective: Patient reports pain is feeling \"better\".     Pain: 3-4/10      Objective:     Accessory motion: Hypomobility with PA glides in the cervical spine       Assessment: Patient had TTP in his cervical muscles and spine so manual treatment was performed today to improve his symptoms. Also, new exercises were added today to improve his postural strength and improve his mobility and flexibility in his cervical and thoracic spine so that he can have less pain while performing ADLs.       Goals:   Pt will improve cervical AROM flexion by 5 degrees to improve tolerance for looking down to tie shoes   Pt will improve cervical AROM extension by 5 degrees to improve tolerance for putting dishes into overhead cabinets   Pt will improve cervical AROM rotation to >50 degrees to improve tolerance for turning head to check blind spot while driving  Pt will have improved thoracic PA mobility to WNL to improve cervical ROM as well as promote upright posturing and decreased pain with working the yard   Pt will improve postural strength (mid/low trap) to 4/5 to promote improved upright posturing and decreased pain with working on the yard    Pt will be independent and compliant with comprehensive HEP to maintain progress achieved in PT    Plan: Add prone exercises.    Date: 9/20/2024  TX#: 2/8 Date:                 TX#: 3/ Date:                 TX#: 4/ Date:                 TX#: 5/ Date:   Tx#: 6/   TE 15'  upper trap stretch 3x30s ea bilat   levator scapulae stretch 3x30s ea bilat  cervical rotation AAROM with towel 3x10 ea bilat   Bilateral ER 3x10 YTB  Seated swiss ball stretch [id number]        MT 15'  STM in the cervical muscles; PA glides in the cervical spine         NMR 15'  Supine Horizontal ABD YTB 3x10   SA punches 3x10   Bilat. Scap. Retraction + Rows/Shoulder extension 3x10 ea YTB  Chin tucks 3x10   Seated thoracic self mob with fit ball 3x10                HEP: Continue with HEP given at IE.     Charges: 1 MT, 1 TE, 1 NMR       Total Timed Treatment: 45 min  Total Treatment Time: 45 min

## 2024-09-23 ENCOUNTER — OFFICE VISIT (OUTPATIENT)
Dept: PHYSICAL THERAPY | Facility: HOSPITAL | Age: 69
End: 2024-09-23
Attending: FAMILY MEDICINE
Payer: MEDICARE

## 2024-09-23 PROCEDURE — 97112 NEUROMUSCULAR REEDUCATION: CPT | Performed by: PHYSICAL THERAPIST

## 2024-09-23 PROCEDURE — 97140 MANUAL THERAPY 1/> REGIONS: CPT | Performed by: PHYSICAL THERAPIST

## 2024-09-23 PROCEDURE — 97110 THERAPEUTIC EXERCISES: CPT | Performed by: PHYSICAL THERAPIST

## 2024-09-23 NOTE — PROGRESS NOTES
Diagnosis:   Neck pain (M54.2)       Referring Provider: Irina Arroyo  Date of Evaluation:    9/18/2024     Precautions:  HTN Next MD visit:   none scheduled  Date of Surgery: n/a   Insurance Primary/Secondary: [employer] [facility] / N/A     # Auth Visits: 8            Subjective: Patient reports feeling \"much better\".     Pain: 3/10      Objective:     Accessory motion: Hypomobility with PA glides in the cervical spine       Assessment: Patient has benefited well in PT with reports of improved pain. New prone exercises were added today to improve his postural strength so that he can have less pain in his neck while performing ADLs. He tolerated well with all exercises without increase in pain.       Goals:   Pt will improve cervical AROM flexion by 5 degrees to improve tolerance for looking down to tie shoes   Pt will improve cervical AROM extension by 5 degrees to improve tolerance for putting dishes into overhead cabinets   Pt will improve cervical AROM rotation to >50 degrees to improve tolerance for turning head to check blind spot while driving  Pt will have improved thoracic PA mobility to WNL to improve cervical ROM as well as promote upright posturing and decreased pain with working the yard   Pt will improve postural strength (mid/low trap) to 4/5 to promote improved upright posturing and decreased pain with working on the yard    Pt will be independent and compliant with comprehensive HEP to maintain progress achieved in PT    Plan: Progress exercises.    Date: 9/20/2024  TX#: 2/8 Date: 9/23/24         TX#: 3/8  Date:                 TX#: 4/ Date:                 TX#: 5/ Date:   Tx#: 6/   TE 15'  upper trap stretch 3x30s ea bilat   levator scapulae stretch 3x30s ea bilat  cervical rotation AAROM with towel 3x10 ea bilat   Bilateral ER 3x10 YTB  Seated swiss ball stretch [id number]  15'  upper trap stretch 3x30s ea bilat   levator scapulae stretch 3x30s ea bilat  cervical rotation AAROM with towel 3x10 ea bilat    Bilateral ER 3x10 YTB  Seated swiss ball stretch [id number]       MT 15'  STM in the cervical muscles; PA glides in the cervical spine  15'  STM in the cervical muscles; PA glides in the cervical spine       NMR 15'  Supine Horizontal ABD YTB 3x10   SA punches 3x10   Bilat. Scap. Retraction + Rows/Shoulder extension 3x10 ea YTB  Chin tucks 3x10   Seated thoracic self mob with fit ball 3x10  15'  Horizontal ABD 3x10 YTB  Prone T & Y 3x10 ea   SA punches 3x10   Bilat. Scap. Retraction + Rows/Shoulder extension 3x10 ea YTB  Chin tucks 3x10   Seated thoracic self mob with fit ball 3x10               HEP: Continue with HEP given at IE.     Charges: 1 MT, 1 TE, 1 NMR       Total Timed Treatment: 45 min  Total Treatment Time: 45 min

## 2024-09-24 NOTE — PROGRESS NOTES
Diagnosis:   Neck pain (M54.2)       Referring Provider: Irina Arroyo  Date of Evaluation:    9/18/2024     Precautions:  HTN Next MD visit:   none scheduled  Date of Surgery: n/a   Insurance Primary/Secondary: [employer] [facility] / N/A     # Auth Visits: 8            Subjective: Pt does not have much pain anymore, just a little bit. Pt had a fall last week Thursday. Had bruising over the left anterior ribs.     Pain: 3/10      Objective: see rx flowsheet   Pain with palpation and mobilization to middle left ribs, referred to anterior angle of ribs where pt has bruising from the fall   Marked hypomobility throughout thoracic and cervical spine   Increased thoracic kyphosis- sway back; hinge point at mid to lower cervical spine    Assessment: Patient with marked spinal hypomobility at baseline, but pt reports improved symptoms and range. He did had increased pain and bruising along the left anterior lower rib anger from a fall. Encouraged pt to monitor symptoms and presentation as the ribs are easy to fracture. He did well with new exercises today, only complaints were related to the ribs. Will reassess next session and consider DC.      Goals:   Pt will improve cervical AROM flexion by 5 degrees to improve tolerance for looking down to tie shoes   Pt will improve cervical AROM extension by 5 degrees to improve tolerance for putting dishes into overhead cabinets   Pt will improve cervical AROM rotation to >50 degrees to improve tolerance for turning head to check blind spot while driving  Pt will have improved thoracic PA mobility to WNL to improve cervical ROM as well as promote upright posturing and decreased pain with working the yard   Pt will improve postural strength (mid/low trap) to 4/5 to promote improved upright posturing and decreased pain with working on the yard    Pt will be independent and compliant with comprehensive HEP to maintain progress achieved in PT    Plan: Consider DC next session   Date:  9/20/2024  TX#: 2/8 Date: 9/23/24         TX#: 3/8  Date:  9/25/24               TX#: 4/8 Date:                 TX#: 5/ Date:   Tx#: 6/   TE 15'  upper trap stretch 3x30s ea bilat   levator scapulae stretch 3x30s ea bilat  cervical rotation AAROM with towel 3x10 ea bilat   Bilateral ER 3x10 YTB  Seated swiss ball stretch [id number]  15'  upper trap stretch 3x30s ea bilat   levator scapulae stretch 3x30s ea bilat  cervical rotation AAROM with towel 3x10 ea bilat   Bilateral ER 3x10 YTB  Seated swiss ball stretch [id number]  18'  upper trap stretch 3x30s ea bilat   levator scapulae stretch 3x30s ea bilat  Seated swiss ball stretch x3'  SL thoracic open book stretch x20 bilat  SB up the wall x20   Thread the needle on plinth x15 bilat     MT 15'  STM in the cervical muscles; PA glides in the cervical spine  15'  STM in the cervical muscles; PA glides in the cervical spine  10'  Prone cervical and thoracic PA glides GIII     NMR 15'  Supine Horizontal ABD YTB 3x10   SA punches 3x10   Bilat. Scap. Retraction + Rows/Shoulder extension 3x10 ea YTB  Chin tucks 3x10   Seated thoracic self mob with fit ball 3x10  15'  Horizontal ABD 3x10 YTB  Prone T & Y 3x10 ea   SA punches 3x10   Bilat. Scap. Retraction + Rows/Shoulder extension 3x10 ea YTB  Chin tucks 3x10   Seated thoracic self mob with fit ball 3x10  12'  Prone gh ext x20  Prone gh horiz abd x20   Horizontal ABD x20 GTB  GH row GTB 20  GH ext GTB x20  Seated thoracic self mob with 1/2 FR x20             HEP: Continue with HEP given at IE.     Charges: 1 MT, 1 TE, 1 NMR       Total Timed Treatment: 40 min  Total Treatment Time: 40 min

## 2024-09-25 ENCOUNTER — OFFICE VISIT (OUTPATIENT)
Dept: PHYSICAL THERAPY | Facility: HOSPITAL | Age: 69
End: 2024-09-25
Attending: FAMILY MEDICINE
Payer: MEDICARE

## 2024-09-25 PROCEDURE — 97112 NEUROMUSCULAR REEDUCATION: CPT

## 2024-09-25 PROCEDURE — 97140 MANUAL THERAPY 1/> REGIONS: CPT

## 2024-09-25 PROCEDURE — 97110 THERAPEUTIC EXERCISES: CPT

## 2024-09-30 NOTE — PROGRESS NOTES
Discharge Summary  Pt has attended 5 visits in Physical Therapy.    Diagnosis:   Neck pain (M54.2)       Referring Provider: Irina Arroyo  Date of Evaluation:    9/18/2024     Precautions:  HTN Next MD visit:   none scheduled  Date of Surgery: n/a   Insurance Primary/Secondary: [employer] [facility] / N/A     # Auth Visits: 8            Subjective: Pt is feeling much better. Pain over the ribs has improved and he says his neck is much better.     Pain: 1/10 stiffness in neck       Objective: see rx flowsheet    Observation: Increased thoracic kyphosis- sway back; hinge point at mid to lower cervical spine   Joint Assessment: Marked hypomobility throughout thoracic and cervical spine    Cervical AROM: (* denotes performed with pain) 10/1/24 Cervical AROM: (* denotes performed with pain) initial   Flexion: 50  Extension: 43  Sidebending: R 40; L 32  Rotation: R 62; L 62 Flexion: 45  Extension: 35*  Sidebending: R 35*; L 35*  Rotation: R 50*; L 50*     Strength: (* denotes performed with pain)  UE/Scapular 10/1/24 UE/Scapular initial   Shoulder Flex: R 4+/5, L 4+/5  Shoulder ABD (C5): R 5/5, L 5/5  Biceps (C6): R 5/5, L 5/5     Rhomboids: R 5-/5, L 5-/5  Mid trap: R 5-/5; L 5-/5  Low trap: R 4+/5; L 4+/5  Lats: 5-/5 B Shoulder Flex: R 4/5, L 4/5  Shoulder ABD (C5): R 4/5, L 4/5  Biceps (C6): R 5/5, L 5/5     Rhomboids: R 4/5, L 4/5  Mid trap: R 4/5; L 4/5  Low trap: R 3/5; L 3/5         Assessment: Patient with marked spinal hypomobility at baseline, but ROM has improved from initial evaluation. Side bending remains limited, L>R; though pt reports subjective improvement. Has minimal to no pain, but will report 3/10 discomfort with end range motions. Reviewed and updated his HEP to focus on spinal mobility and postural control to reduce strain in the neck and thoracic spine.       Goals:   Pt will improve cervical AROM flexion by 5 degrees to improve tolerance for looking down to tie shoes met  Pt will improve cervical AROM  extension by 5 degrees to improve tolerance for putting dishes into overhead cabinets met  Pt will improve cervical AROM rotation to >50 degrees to improve tolerance for turning head to check blind spot while driving met  Pt will have improved thoracic PA mobility to WNL to improve cervical ROM as well as promote upright posturing and decreased pain with working the yard ongoing hypomobility in thoracic spine  Pt will improve postural strength (mid/low trap) to 4/5 to promote improved upright posturing and decreased pain with working on the yard met   Pt will be independent and compliant with comprehensive HEP to maintain progress achieved in PT met    Unable to obtain post-NDI    Plan: DC from PT. Continue with HEP. Call for remaining questions or concerns.    Thank you for your referral. If you have any questions, please contact me at Dept: [phone].    Sincerely,  Electronically signed by therapist: Godwin Leiva PT     Certification From: 9/30/2024  To:12/29/2024     Date: 9/20/2024  TX#: 2/8 Date: 9/23/24         TX#: 3/8  Date:  9/25/24               TX#: 4/8 Date:  10/1/24               TX#: 5/8 Date:   Tx#: 6/   TE 15'  upper trap stretch 3x30s ea bilat   levator scapulae stretch 3x30s ea bilat  cervical rotation AAROM with towel 3x10 ea bilat   Bilateral ER 3x10 YTB  Seated swiss ball stretch [id number]  15'  upper trap stretch 3x30s ea bilat   levator scapulae stretch 3x30s ea bilat  cervical rotation AAROM with towel 3x10 ea bilat   Bilateral ER 3x10 YTB  Seated swiss ball stretch [id number]  18'  upper trap stretch 3x30s ea bilat   levator scapulae stretch 3x30s ea bilat  Seated swiss ball stretch x3'  SL thoracic open book stretch x20 bilat  SB up the wall x20   Thread the needle on plinth x15 bilat 12'  Reassessment- see findings above  SCM stretch demo for home x2'  Wall slides x20  HEP update    MT 15'  STM in the cervical muscles; PA glides in the cervical spine  15'  STM in the cervical muscles;  PA glides in the cervical spine  10'  Prone cervical and thoracic PA glides GIII 15'  Prone cervical and thoracic PA glides GIII  Supine cervical PA glides UPA and CPA GIII  Cervical side glides GIII  STM cervical PS, UT, LS, and SCM (L>R)    NMR 15'  Supine Horizontal ABD YTB 3x10   SA punches 3x10   Bilat. Scap. Retraction + Rows/Shoulder extension 3x10 ea YTB  Chin tucks 3x10   Seated thoracic self mob with fit ball 3x10  15'  Horizontal ABD 3x10 YTB  Prone T & Y 3x10 ea   SA punches 3x10   Bilat. Scap. Retraction + Rows/Shoulder extension 3x10 ea YTB  Chin tucks 3x10   Seated thoracic self mob with fit ball 3x10  12'  Prone gh ext x20  Prone gh horiz abd x20   Horizontal ABD x20 GTB  GH row GTB 20  GH ext GTB x20  Seated thoracic self mob with 1/2 FR x20 15'  SNAG cervical rotation x10 bilat  Horizontal ABD x20 GTB  Bilat shoulder ER GTB x20   GH row GTB 20  GH ext GTB x20            HEP: Continue with HEP given at IE.   - Sternocleidomastoid Stretch  - 1 x daily - 7 x weekly - 3 sets - 10 sec hold  - Standing Shoulder Horizontal Abduction with Resistance  - 1 x daily - 3-5 x weekly - 2 sets - 10 reps  - Shoulder External Rotation and Scapular Retraction with Resistance  - 1 x daily - 3-5 x weekly - 3 sets - 10 reps  - Standing Shoulder Row with Anchored Resistance  - 1 x daily - 3-5 x weekly - 2 sets - 10 reps  - Shoulder extension with resistance - Neutral  - 1 x daily - 3-5 x weekly - 2 sets - 10 reps  - Standing shoulder flexion wall slides  - 1 x daily - 7 x weekly - 2 sets - 10 reps    Charges: 1 MT, 1 TE, 1 NMR       Total Timed Treatment: 42 min  Total Treatment Time: 42 min

## 2024-10-01 ENCOUNTER — OFFICE VISIT (OUTPATIENT)
Dept: PHYSICAL THERAPY | Facility: HOSPITAL | Age: 69
End: 2024-10-01
Attending: FAMILY MEDICINE
Payer: MEDICARE

## 2024-10-01 PROCEDURE — 97140 MANUAL THERAPY 1/> REGIONS: CPT

## 2024-10-01 PROCEDURE — 97112 NEUROMUSCULAR REEDUCATION: CPT

## 2024-10-01 PROCEDURE — 97110 THERAPEUTIC EXERCISES: CPT

## 2024-11-26 ENCOUNTER — TELEPHONE (OUTPATIENT)
Dept: SURGERY | Facility: CLINIC | Age: 69
End: 2024-11-26

## 2024-12-04 NOTE — PROGRESS NOTES
[facility] Urology  Follow Up Visit    HPI:   Alfredo Guerra is a 69 year old male here today for follow-up. He was last seen on 09/05/2024.     The patient states he is doing much better. He was started on Tamsulosin 0.4 mg at bedtime at this last visit. Denies side effects.    Previous IPSS 7 (2/1/0/0/1/1/2) with QoL Score of 2. PVR 81 mL.        PSA Screen   Latest Ref Rng <=4.00 ng/mL   8/21/2024 1.36        Past Medical History:    Colon polyps    >10 polyps, repeat CLN In 2025    Esophageal reflux    Essential hypertension    High blood pressure    Tubular adenoma of colon    10 polyps, repeat CLN in 2022     Past Surgical History:   Procedure Laterality Date    Colonoscopy N/A 07/28/2021    Procedure: COLONOSCOPY;  Surgeon: SAHIL Hoover MD;  Location: [facility] ENDOSCOPY    Colonoscopy N/A 4/9/2024    Procedure: COLONOSCOPY;  Surgeon: SAHIL Hoover MD;  Location: [facility] ENDOSCOPY    Laparoscopy,diagnostic      Other surgical history      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     Total hip replacement       Family History   Problem Relation Age of Onset    No Known Problems Father     Diabetes Mother     No Known Problems Daughter     No Known Problems Son     No Known Problems Maternal Grandmother     No Known Problems Maternal Grandfather     No Known Problems Paternal Grandmother     No Known Problems Paternal Grandfather     No Known Problems Sister     No Known Problems Brother      Social History     Socioeconomic History    Marital status:    Tobacco Use    Smoking status: Never    Smokeless tobacco: Never   Vaping Use    Vaping status: Never Used   Substance and Sexual Activity    Alcohol use: Not Currently    Drug use: Never     Current Outpatient Medications   Medication Sig Dispense Refill    tamsulosin 0.4 MG Oral Cap Take 1 capsule (0.4 mg total) by mouth daily. Take 1/2 hour following the same meal each day 90 capsule 3    fenofibrate 145 MG Oral Tab Take 1 tablet (145 mg total) by mouth daily. 90  tablet 1    LISINOPRIL 20 MG Oral Tab TAKE 1 TABLET(20 MG) BY MOUTH DAILY 90 tablet 1       Allergies: Patient has no known allergies.    REVIEW OF SYSTEMS:  Review of Systems   All other systems reviewed and are negative.        EXAM:  There were no vitals taken for this visit.    Physical Exam  Constitutional:       Appearance: Normal appearance.   HENT:      Head: Normocephalic and atraumatic.      Mouth/Throat:      Mouth: Mucous membranes are moist.   Pulmonary:      Effort: Pulmonary effort is normal.   Abdominal:      General: Abdomen is flat.      Palpations: Abdomen is soft.   Skin:     General: Skin is warm and dry.   Neurological:      Mental Status: He is alert and oriented to person, place, and time.   Psychiatric:         Mood and Affect: Mood normal.         Thought Content: Thought content normal.          LABS:  No results found for: \"PSA\", \"QPSA\", \"TOTPSASCREEN\"  Lab Results   Component Value Date    WBC 5.4 08/21/2024    RBC 4.79 08/21/2024    HGB 15.0 08/21/2024    HCT 42.5 08/21/2024    MCV 88.7 08/21/2024    MCH 31.3 08/21/2024    MCHC 35.3 08/21/2024    RDW 11.9 08/21/2024    .0 08/21/2024     Lab Results   Component Value Date    GLU 84 08/21/2024    BUN 9 08/21/2024    BUNCREA 10.5 08/21/2024    CREATSERUM 0.86 08/21/2024    ANIONGAP 6 08/21/2024    GFRNAA 92 01/27/2021    GFRAA 106 01/27/2021    CA 9.4 08/21/2024     08/21/2024    K 4.3 08/21/2024     08/21/2024    CO2 25.0 08/21/2024     No results found for: \"PTP\", \"PT\", \"INR\"    IMAGING:  No results found.    IMPRESSION:  BPH with LUTS     PLAN:  - Continue Tamsulosin 0.4 mg at bedtime  - Follow-up in one year     ALPHONSO Cordero  12/5/2024

## 2024-12-05 ENCOUNTER — OFFICE VISIT (OUTPATIENT)
Dept: SURGERY | Facility: CLINIC | Age: 69
End: 2024-12-05

## 2024-12-05 DIAGNOSIS — N13.8 BPH WITH OBSTRUCTION/LOWER URINARY TRACT SYMPTOMS: Primary | ICD-10-CM

## 2024-12-05 DIAGNOSIS — N40.1 BPH WITH OBSTRUCTION/LOWER URINARY TRACT SYMPTOMS: Primary | ICD-10-CM

## 2024-12-05 PROCEDURE — 1159F MED LIST DOCD IN RCRD: CPT

## 2024-12-05 PROCEDURE — 1160F RVW MEDS BY RX/DR IN RCRD: CPT

## 2024-12-05 PROCEDURE — 99212 OFFICE O/P EST SF 10 MIN: CPT

## 2024-12-05 RX ORDER — TAMSULOSIN HYDROCHLORIDE 0.4 MG/1
0.4 CAPSULE ORAL DAILY
Qty: 90 CAPSULE | Refills: 3 | Status: SHIPPED | OUTPATIENT
Start: 2024-12-05 | End: 2025-11-30

## 2025-02-17 ENCOUNTER — TELEPHONE (OUTPATIENT)
Facility: CLINIC | Age: 70
End: 2025-02-17

## 2025-02-17 NOTE — TELEPHONE ENCOUNTER
Patient outreach message received:    Entered into 1 yr CLN recall  Recall colon in 1 year by .  Colon done 4/09/2024    Recall reminder letter mailed out to patient.

## 2025-06-04 ENCOUNTER — OFFICE VISIT (OUTPATIENT)
Dept: FAMILY MEDICINE CLINIC | Facility: CLINIC | Age: 70
End: 2025-06-04

## 2025-06-04 VITALS
WEIGHT: 206 LBS | OXYGEN SATURATION: 96 % | HEART RATE: 69 BPM | SYSTOLIC BLOOD PRESSURE: 104 MMHG | RESPIRATION RATE: 20 BRPM | DIASTOLIC BLOOD PRESSURE: 68 MMHG | TEMPERATURE: 97 F | BODY MASS INDEX: 28.84 KG/M2 | HEIGHT: 71 IN

## 2025-06-04 DIAGNOSIS — Z00.00 ENCOUNTER FOR ANNUAL HEALTH EXAMINATION: ICD-10-CM

## 2025-06-04 DIAGNOSIS — D12.6 ADENOMATOUS POLYP OF COLON, UNSPECIFIED PART OF COLON: Primary | ICD-10-CM

## 2025-06-04 DIAGNOSIS — R35.0 BENIGN PROSTATIC HYPERPLASIA WITH URINARY FREQUENCY: ICD-10-CM

## 2025-06-04 DIAGNOSIS — N40.1 BENIGN PROSTATIC HYPERPLASIA WITH URINARY FREQUENCY: ICD-10-CM

## 2025-06-04 DIAGNOSIS — I10 ESSENTIAL HYPERTENSION: ICD-10-CM

## 2025-06-04 DIAGNOSIS — Z12.5 SCREENING FOR PROSTATE CANCER: ICD-10-CM

## 2025-06-04 PROCEDURE — 1170F FXNL STATUS ASSESSED: CPT | Performed by: FAMILY MEDICINE

## 2025-06-04 PROCEDURE — 3008F BODY MASS INDEX DOCD: CPT | Performed by: FAMILY MEDICINE

## 2025-06-04 PROCEDURE — 96160 PT-FOCUSED HLTH RISK ASSMT: CPT | Performed by: FAMILY MEDICINE

## 2025-06-04 PROCEDURE — 1125F AMNT PAIN NOTED PAIN PRSNT: CPT | Performed by: FAMILY MEDICINE

## 2025-06-04 PROCEDURE — 3078F DIAST BP <80 MM HG: CPT | Performed by: FAMILY MEDICINE

## 2025-06-04 PROCEDURE — 1159F MED LIST DOCD IN RCRD: CPT | Performed by: FAMILY MEDICINE

## 2025-06-04 PROCEDURE — 1160F RVW MEDS BY RX/DR IN RCRD: CPT | Performed by: FAMILY MEDICINE

## 2025-06-04 PROCEDURE — 3074F SYST BP LT 130 MM HG: CPT | Performed by: FAMILY MEDICINE

## 2025-06-04 PROCEDURE — G0439 PPPS, SUBSEQ VISIT: HCPCS | Performed by: FAMILY MEDICINE

## 2025-06-04 NOTE — PROGRESS NOTES
Subjective:   Alfredo Guerra is a 69 year old male who presents for a MA AHA (Medicare Advantage Annual Health Assessment) and Medicare Subsequent Annual Wellness visit (Pt already had Initial Annual Wellness) and stable chronic illnesses (not addressed in visit).               History/Other:   Fall Risk Assessment:   He has been screened for Falls and is low risk.      Cognitive Assessment:   He had a completely normal cognitive assessment - see flowsheet entries     Functional Ability/Status:   Alfredo Guerra has some abnormal functions as listed below:  He has difficulties Affording Meds based on screening of functional status.       Depression Screening (PHQ):  PHQ-2 SCORE: 0  , done 6/4/2025        5 minutes spent screening and counseling for depression    Advanced Directives:   He does NOT have a Living Will. [ ]  He does NOT have a Power of  for Health Care. [Do you have a healthcare power of ?: No]  Discussed Advance Care Planning with patient (and family/surrogate if present). Standard forms made available to patient in After Visit Summary.      Problem List[1]  Allergies:  He has no known allergies.    Current Medications:  Active Meds, Sig Only[2]    Medical History:  He  has a past medical history of Colon polyps (2024), Esophageal reflux, Essential hypertension, High blood pressure, and Tubular adenoma of colon (2021).  Surgical History:  He  has a past surgical history that includes other surgical history; colonoscopy (N/A, 07/28/2021); total hip replacement; laparoscopy,diagnostic; and colonoscopy (N/A, 4/9/2024).   Family History:  His family history includes Diabetes in his mother; No Known Problems in his brother, daughter, father, maternal grandfather, maternal grandmother, paternal grandfather, paternal grandmother, sister, and son.  Social History:  He  reports that he has never smoked. He has never used smokeless tobacco. He reports that he does not currently use alcohol. He  reports that he does not use drugs.    Tobacco:  He has never smoked tobacco.    CAGE Alcohol Screen:   CAGE screening score of 0 on 6/4/2025, showing low risk of alcohol abuse.      Patient Care Team:  Irina Arroyo MD as PCP - General (Family Medicine)  Christelle Toussaint as Physical Therapist (Physical Therapy)  Arturo Bosch, PT (Physical Therapy)  Godwin Leiva PT as Physical Therapist (Physical Therapy)    Review of Systems  GENERAL: feels well otherwise  SKIN: denies any unusual skin lesions  EYES: denies blurred vision or double vision  HEENT: denies nasal congestion, sinus pain or ST  LUNGS: denies shortness of breath with exertion  CARDIOVASCULAR: denies chest pain on exertion  GI: denies abdominal pain, denies heartburn  : 1 per night nocturia, no complaint of urinary incontinence  MUSCULOSKELETAL: denies back pain  NEURO: denies headaches  PSYCHE: denies depression or anxiety  HEMATOLOGIC: denies hx of anemia  ENDOCRINE: denies thyroid history  ALL/ASTHMA: denies hx of allergy or asthma    Objective:   Physical Exam  General Appearance:  Alert, cooperative, no distress, appears stated age   Head:  Normocephalic, without obvious abnormality, atraumatic   Eyes:  PERRL, conjunctiva/corneas clear, EOM's intact, both eyes   Ears:  Normal TM's and external ear canals, both ears   Nose: Nares normal, septum midline, mucosa normal, no drainage or sinus tenderness   Throat: Lips, mucosa, and tongue normal; teeth and gums normal   Neck: Supple, symmetrical, trachea midline, no adenopathy, thyroid: not enlarged, symmetric, no tenderness/mass/nodules, no carotid bruit or JVD   Back:   Symmetric, no curvature, ROM normal, no CVA tenderness   Lungs:   Clear to auscultation bilaterally, respirations unlabored   Chest Wall:  No tenderness or deformity   Heart:  Regular rate and rhythm, S1, S2 normal, no murmur, rub or gallop   Abdomen:   Soft, non-tender, bowel sounds active all four quadrants,  no masses, no  organomegaly                                 /68   Pulse 69   Temp 96.8 °F (36 °C) (Temporal)   Resp 20   Ht 5' 11\" (1.803 m)   Wt 206 lb (93.4 kg)   SpO2 96%   BMI 28.73 kg/m²  Estimated body mass index is 28.73 kg/m² as calculated from the following:    Height as of this encounter: 5' 11\" (1.803 m).    Weight as of this encounter: 206 lb (93.4 kg).    Medicare Hearing Assessment:   Hearing Screening    Time taken: 6/4/2025  8:54 AM  Entry User: Camryn Childers  Screening Method: Finger Rub  Finger Rub Result: Pass               Assessment & Plan:   Alfredo Guerra is a 69 year old male who presents for a Medicare Assessment.     1. Adenomatous polyp of colon, unspecified part of colon (Primary)  -     Gastro Referral - In Network  Needs to do colonoscopy   2. Essential hypertension  Controlled cpm  -     Comp Metabolic Panel (14); Future; Expected date: 06/04/2025  -     Lipid Panel; Future; Expected date: 06/04/2025  -     CBC With Differential With Platelet; Future; Expected date: 06/04/2025  3. Benign prostatic hyperplasia with urinary frequency  Controlled cpm  4. Screening for prostate cancer  -     PSA Total, Screen; Future; Expected date: 06/04/2025            The patient indicates understanding of these issues and agrees to the plan.  Reinforced healthy diet, lifestyle, and exercise.      No follow-ups on file.     Irina Arroyo MD, 6/4/2025     Supplementary Documentation:   General Health:  In the past six months, have you lost more than 10 pounds without trying?: 1 - Yes  Has your appetite been poor?: No  Type of Diet: Balanced  How does the patient maintain a good energy level?: Daily Walks  How would you describe your current health state?: Good  How do you maintain positive mental well-being?: Visiting Family  On a scale of 0 to 10, with 0 being no pain and 10 being severe pain, what is your pain level?: 5 - (Moderate)  In the past six months, have you experienced urine leakage?: 0-No  At  any time do you feel concerned for the safety/well-being of yourself and/or your children, in your home or elsewhere?: No  Have you had any immunizations at another office such as Influenza, Hepatitis B, Tetanus, or Pneumococcal?: No    Health Maintenance   Topic Date Due    Zoster Vaccines (1 of 2) Never done    COVID-19 Vaccine (4 - 2024-25 season) 09/01/2024    Annual Well Visit  01/01/2025    Colorectal Cancer Screening  04/09/2025    Influenza Vaccine (Season Ended) 10/01/2025    PSA  08/21/2026    Annual Depression Screening  Completed    Fall Risk Screening (Annual)  Completed    Pneumococcal Vaccine: 50+ Years  Completed    Meningococcal B Vaccine  Aged Out            [1]   Patient Active Problem List  Diagnosis    Polyp of colon    Tortuous colon   [2]   Outpatient Medications Marked as Taking for the 6/4/25 encounter (Office Visit) with Irina Arroyo MD   Medication Sig    tamsulosin 0.4 MG Oral Cap Take 1 capsule (0.4 mg total) by mouth daily. Take 1/2 hour following the same meal each day    fenofibrate 145 MG Oral Tab Take 1 tablet (145 mg total) by mouth daily.    LISINOPRIL 20 MG Oral Tab TAKE 1 TABLET(20 MG) BY MOUTH DAILY

## 2025-06-09 ENCOUNTER — LAB ENCOUNTER (OUTPATIENT)
Dept: LAB | Facility: HOSPITAL | Age: 70
End: 2025-06-09
Attending: FAMILY MEDICINE
Payer: MEDICARE

## 2025-06-09 DIAGNOSIS — Z12.5 SCREENING FOR PROSTATE CANCER: ICD-10-CM

## 2025-06-09 DIAGNOSIS — I10 ESSENTIAL HYPERTENSION: ICD-10-CM

## 2025-06-09 LAB
ALBUMIN SERPL-MCNC: 4.6 G/DL (ref 3.2–4.8)
ALBUMIN/GLOB SERPL: 1.6 {RATIO} (ref 1–2)
ALP LIVER SERPL-CCNC: 95 U/L (ref 45–117)
ALT SERPL-CCNC: 36 U/L (ref 10–49)
ANION GAP SERPL CALC-SCNC: 7 MMOL/L (ref 0–18)
AST SERPL-CCNC: 26 U/L (ref ?–34)
ATRIAL RATE: 75 BPM
BASOPHILS # BLD AUTO: 0.04 X10(3) UL (ref 0–0.2)
BASOPHILS NFR BLD AUTO: 0.7 %
BILIRUB SERPL-MCNC: 0.6 MG/DL (ref 0.2–1.1)
BUN BLD-MCNC: 14 MG/DL (ref 9–23)
BUN/CREAT SERPL: 15.4 (ref 10–20)
CALCIUM BLD-MCNC: 9.5 MG/DL (ref 8.7–10.4)
CHLORIDE SERPL-SCNC: 103 MMOL/L (ref 98–112)
CHOLEST SERPL-MCNC: 183 MG/DL (ref ?–200)
CO2 SERPL-SCNC: 24 MMOL/L (ref 21–32)
COMPLEXED PSA SERPL-MCNC: 1.34 NG/ML (ref ?–4)
CREAT BLD-MCNC: 0.91 MG/DL (ref 0.7–1.3)
DEPRECATED RDW RBC AUTO: 40.3 FL (ref 35.1–46.3)
EGFRCR SERPLBLD CKD-EPI 2021: 91 ML/MIN/1.73M2 (ref 60–?)
EOSINOPHIL # BLD AUTO: 0.07 X10(3) UL (ref 0–0.7)
EOSINOPHIL NFR BLD AUTO: 1.3 %
ERYTHROCYTE [DISTWIDTH] IN BLOOD BY AUTOMATED COUNT: 12.3 % (ref 11–15)
FASTING PATIENT LIPID ANSWER: NO
FASTING STATUS PATIENT QL REPORTED: NO
GLOBULIN PLAS-MCNC: 2.8 G/DL (ref 2–3.5)
GLUCOSE BLD-MCNC: 84 MG/DL (ref 70–99)
HCT VFR BLD AUTO: 41 % (ref 39–53)
HDLC SERPL-MCNC: 32 MG/DL (ref 40–59)
HGB BLD-MCNC: 14 G/DL (ref 13–17.5)
IMM GRANULOCYTES # BLD AUTO: 0.03 X10(3) UL (ref 0–1)
IMM GRANULOCYTES NFR BLD: 0.5 %
LDLC SERPL CALC-MCNC: 90 MG/DL (ref ?–100)
LYMPHOCYTES # BLD AUTO: 2.08 X10(3) UL (ref 1–4)
LYMPHOCYTES NFR BLD AUTO: 37.4 %
MCH RBC QN AUTO: 30.4 PG (ref 26–34)
MCHC RBC AUTO-ENTMCNC: 34.1 G/DL (ref 31–37)
MCV RBC AUTO: 89.1 FL (ref 80–100)
MONOCYTES # BLD AUTO: 0.92 X10(3) UL (ref 0.1–1)
MONOCYTES NFR BLD AUTO: 16.5 %
NEUTROPHILS # BLD AUTO: 2.42 X10 (3) UL (ref 1.5–7.7)
NEUTROPHILS # BLD AUTO: 2.42 X10(3) UL (ref 1.5–7.7)
NEUTROPHILS NFR BLD AUTO: 43.6 %
NONHDLC SERPL-MCNC: 151 MG/DL (ref ?–130)
OSMOLALITY SERPL CALC.SUM OF ELEC: 278 MOSM/KG (ref 275–295)
P AXIS: 43 DEGREES
P-R INTERVAL: 160 MS
PLATELET # BLD AUTO: 339 10(3)UL (ref 150–450)
POTASSIUM SERPL-SCNC: 4.5 MMOL/L (ref 3.5–5.1)
PROT SERPL-MCNC: 7.4 G/DL (ref 5.7–8.2)
Q-T INTERVAL: 392 MS
QRS DURATION: 86 MS
QTC CALCULATION (BEZET): 437 MS
R AXIS: 50 DEGREES
RBC # BLD AUTO: 4.6 X10(6)UL (ref 3.8–5.8)
SODIUM SERPL-SCNC: 134 MMOL/L (ref 136–145)
T AXIS: 23 DEGREES
TRIGL SERPL-MCNC: 366 MG/DL (ref 30–149)
VENTRICULAR RATE: 75 BPM
VLDLC SERPL CALC-MCNC: 60 MG/DL (ref 0–30)
WBC # BLD AUTO: 5.6 X10(3) UL (ref 4–11)

## 2025-06-09 PROCEDURE — 93005 ELECTROCARDIOGRAM TRACING: CPT

## 2025-06-09 PROCEDURE — 80053 COMPREHEN METABOLIC PANEL: CPT

## 2025-06-09 PROCEDURE — 93010 ELECTROCARDIOGRAM REPORT: CPT | Performed by: INTERNAL MEDICINE

## 2025-06-09 PROCEDURE — 36415 COLL VENOUS BLD VENIPUNCTURE: CPT

## 2025-06-09 PROCEDURE — 85025 COMPLETE CBC W/AUTO DIFF WBC: CPT

## 2025-06-09 PROCEDURE — 80061 LIPID PANEL: CPT

## (undated) DEVICE — LINE MNTR ADLT SET O2 INTMD

## (undated) DEVICE — GIJAW SINGLE-USE BIOPSY FORCEPS WITH NEEDLE: Brand: GIJAW

## (undated) DEVICE — KIT ENDO ORCAPOD 160/180/190

## (undated) DEVICE — MEDI-VAC NON-CONDUCTIVE SUCTION TUBING 6MM X 1.8M (6FT.) L: Brand: CARDINAL HEALTH

## (undated) DEVICE — 35 ML SYRINGE REGULAR TIP: Brand: MONOJECT

## (undated) DEVICE — KIT CLEAN ENDOKIT 1.1OZ GOWNX2

## (undated) DEVICE — LASSO POLYPECTOMY SNARE: Brand: LASSO

## (undated) DEVICE — 60 ML SYRINGE REGULAR TIP: Brand: MONOJECT

## (undated) DEVICE — SNARE OPTMZ PLPCTM TRP

## (undated) DEVICE — FORCEP RADIAL JAW 4

## (undated) DEVICE — Device: Brand: CUSTOM PROCEDURE KIT

## (undated) DEVICE — Device: Brand: DUAL NARE NASAL CANNULAE FEMALE LUER CON 7FT O2 TUBE

## (undated) DEVICE — MASK PROC W/VISOR ANTIGLARE

## (undated) DEVICE — SNARE ENDOSCOPIC 10MM ROUND

## (undated) DEVICE — Device: Brand: DEFENDO AIR/WATER/SUCTION AND BIOPSY VALVE

## (undated) NOTE — LETTER
Patient Name: Alfredo Guerra : 1955  Medical Record #: [id number] Printed: 4/3/2024  Page 1 of 2                                          [facility]  [address], [place], [place]  Autorización para operación y procedimiento quirúrgico                                                                                                      Por la presente, autorizo a SAHIL Hoover MD, mi médico y al asistente a realizar la operación/procedimiento quirúrgico a continuación, así jesus a administrar la anestesia que determine necesaria mi médico Nombre (s) de la operación/procedimiento: COLONOSCOPY en Alfredo Guerra     Reconozco que julian la operación/procedimiento quirúrgico, las condiciones imprevistas pueden requerir de procedimientos adicionales o diferentes a aquellos mencionados anteriormente.  Por lo tanto, autorizo y solicito además que el cirujano antes mencionado, los asistentes o las personas designadas realicen los procedimientos que, a vasquez juicio, mishel necesarios y convenientes.    Mi cirujano/médico givens discutido antes de mi cirugía los posibles beneficios, riesgos y efectos secundarios de brown procedimiento, la probabilidad de alcanzar las metas y los posibles problemas que puedan ocurrir julian la recuperación.  Ellos también sykes discutido las alternativas razonables al procedimiento, incluso los riesgos, beneficios y efectos secundarios relacionados con las alternativas y los riesgos relacionados con no realizar brown procedimiento.  Sykes respondido a todas mis preguntas y confirmo que no se ha dado ninguna garantía en cuanto a los resultados que pueda obtener.    En elpidio de que surja la necesidad julian mi operación o julian el periodo postoperatorio, también autorizo se aplique yaya y/o productos sanguíneos.  Asimismo, entiendo que a pesar de las cuidadosas pruebas y análisis de yaya o de los productos sanguíneos que realizan las entidades recolectoras, todavía puedo  estar sujeto a efectos adversos jesus resultado de recibir javier transfusión de yaya y/o productos sanguíneos.  A continuación se mencionan algunos, aunque no todos, los riesgos potenciales que pueden ocurrir: fiebre y reacciones alérgicas, reacciones hemolíticas, trasmisión de enfermedades jesus hepatitis, SIDA y citomegalovirus (CMV), así jesus sobrecarga de líquidos.   En elpidio de que desee tener javier transfusión autóloga de mi propia yaya o javier transfusión de un donante dirigido.  Lo discutiré con mi médico.  Check only if Refusing Blood or Blood Products  I understand refusal of blood or blood products as deemed necessary by my physician may have serious consequences to my condition to include possible death. I hereby assume responsibility for my refusal and release the hospital, its personnel, and my physicians from any responsibility for the consequences of my refusal.           o  Refuse       Autorizo el uso de cualquier muestra, órgano, tejido, parte del cuerpo u objeto extraño que pueda ser extraído de mi cuerpo julian la operación/procedimiento para fines de diagnóstico, investigación o de enseñanza y vasquez desecho posterior por las autoridades del hospital.  También, autorizo la revelación de los resultados de las pruebas de muestras y/o los informes escritos al médico tratante jesus personal médico del hospital u otros médicos de referencia o consulta involucrados en mi atención, a discreción del patólogo o de mi médico tratante.    Doy consentimiento para que se fotografíen o graben vídeos de las operaciones o procedimientos a realizarse, incluidas las partes de mi cuerpo que mishel adecuadas para propósitos médicos, científicos o educativos, en el entendido de que, mi identidad no sea revelada por las fotografías o por textos descriptivos que las acompañen.  Si el procedimiento es fotografiado o grabado en vídeo, el cirujano obtendrá la imagen, cinta de vídeo o CD original.  El hospital no se hará  responsable por el almacenamiento, la revelación o el mantenimiento de la imagen, cinta o CD.    Autorizo la presencia de un especialista de producto u observadores en el quirófano, según lo considere necesario mi médico o las personas que éste designe.     Reconozco que en elpidio de que mi procedimiento resulte en un tiempo prolongado de radiografía/fluoroscopia, puedo desarrollar javier reacción en la piel.    Si tengo javier orden de No intentar la reanimación (REUBEN), adams estado se suspenderá mientras esté en el quirófano, la medina de procedimientos y julian el periodo de recuperación a menos que yo indique lo contrario explícitamente (o javier persona autorizada a rose mary el consentimiento en mi nombre). El cirujano o mi médico tratante determinarán cuándo termina el periodo de recuperación aplicable a los efectos de restablecer la orden de REUBEN.  Pacientes que se realizan un procedimiento de esterilización: Entiendo que si el procedimiento tiene éxito, los resultados serán permanentes y que, por lo tanto, me será imposible inseminar, concebir o tener hijos.  Asimismo, entiendo que el procedimiento tiene jesus propósito la esterilidad, aunque el resultado no está garantizado.   Admito que mi médico me ha explicado la aplicación de sedación/analgesia, incluidos los riesgos y beneficios y, consiento a la administración de sedación/analgesia conforme sea necesario o conveniente a juicio de mi médico.      CERTIFICO QUE HE LEÍDO Y COMPRENDIDO EL CONSENTIMIENTO ANTERIOR PARA LA OPERACIÓN y/o PROCEDIMIENTO.             ______________________________________  _______________________________  Firma del paciente      Firma de la persona responsible  Signature of patient      Signature of responsible person                        ___________________________________                                   Nombre en imprenta de la persona responsible         Name of responsible person          ___________________________________                  Relación con el paciente         Relationship to patient    _________________________________________  ______________ ________________  Firma del testigo          Fecha / Date Hora / Time  Signature of witness    DECLARACÓN DEL MÉDICO Mediante mi firma al calce, afirmo que antes de la hora del procedimiento, he explicadoal paciente y/o a vasquez representante legal, los riesgos y beneficios involucrados en el tratamiento propuesto, así comocualquier alternativa razonable al tratamiento propuesto. También fermin(s) he explicado los riesgos y beneficios involucradosen el rechazo del tratarniento propuesto y alternativas al tratamiento propuesto, y he respondido a las preguntas del(la) paciente(My signature below affirms that prior to the time of the procedure, I have explained to the patient and/or his/her guardian, therisks and benefits involved in the proposed treatment and any reasonable alternative to the proposed treatment. I have alsoexplained the risks and benefits involved in refusal of the proposed treatment and have answered the patient's questions.)    ________________________________________   _________________________   _____________   (Firma del médico/Signature of Physician)                                    (Fecha/Date)                                             (Hora/Time)      Patient Name: Alfredo Guerra     : 1955                 Printed: 4/3/2024      Medical Record #: [id number]                                              Page 2 of 2

## (undated) NOTE — LETTER
2/6/2024          To Dr. Jeff Simental     Mr, Alfredo Guerra is patient of mine and he is clear for cataract surgery   No further testing is needed at this time     If you require additional information please contact our office.        Sincerely,      Irina Arroyo MD          Document generated by:  Irina Arroyo MD

## (undated) NOTE — LETTER
5/17/2022    Devan Arredondo        [address] [facility] [place] [address]            Dear Devan Arredondo,      Our records indicate that you are due for an appointment for a Colonoscopy in July 2022, or sometime there after, with Manuel Leo MD.    Please call our office to schedule this appointment. Your medical well-being is important to us. If your insurance requires a referral, please call your primary care office to request one.       Thank you,      [facility] at [facility]

## (undated) NOTE — LETTER
2/17/2025    Alfredo Guerra        [address]        [facility] [address]            Dear Alfredo Guerra,      Our records indicate that you are due for an appointment for a Colonoscopy with RENEE Hoover MD. Our doctors are booking out about 3-6 months in advance for procedures.     Please call our office to schedule this appointment.  Your medical well-being is important to us.    If your insurance requires a referral, please call your primary care office to request one.      Thank you,      [facility] and [facility] at [facility]

## (undated) NOTE — LETTER
[facility], [facility]  [address]  [place] [address]  PH: [phone]  FAX: [phone]    Alfredo Guerra        [address]        [facility] [address]            Dear Alfredo Guerra,      Our records indicate that you are due for an appointment for a Colonoscopy with RENEE Hoover MD. Our doctors are booking out about 3-6 months in advance for procedures.     Please call our office to schedule this appointment.  Your medical well-being is important to us.    If your insurance requires a referral, please call your primary care office to request one.      Thank you,      [facility] Phys[facility] and Staff at [facility]